# Patient Record
Sex: MALE | Race: WHITE | NOT HISPANIC OR LATINO | Employment: OTHER | URBAN - METROPOLITAN AREA
[De-identification: names, ages, dates, MRNs, and addresses within clinical notes are randomized per-mention and may not be internally consistent; named-entity substitution may affect disease eponyms.]

---

## 2018-10-05 ENCOUNTER — OFFICE VISIT (OUTPATIENT)
Dept: ORTHOPEDIC SURGERY | Facility: CLINIC | Age: 58
End: 2018-10-05

## 2018-10-05 VITALS — BODY MASS INDEX: 35.57 KG/M2 | HEIGHT: 70 IN | OXYGEN SATURATION: 98 % | HEART RATE: 77 BPM | WEIGHT: 248.46 LBS

## 2018-10-05 DIAGNOSIS — M76.62 TENDONITIS, ACHILLES, LEFT: Primary | ICD-10-CM

## 2018-10-05 PROCEDURE — 99203 OFFICE O/P NEW LOW 30 MIN: CPT | Performed by: PHYSICIAN ASSISTANT

## 2018-10-05 RX ORDER — METFORMIN HYDROCHLORIDE 500 MG/1
500 TABLET, EXTENDED RELEASE ORAL DAILY
Refills: 1 | COMMUNITY
Start: 2018-09-07

## 2018-10-05 RX ORDER — DICLOFENAC SODIUM 75 MG/1
75 TABLET, DELAYED RELEASE ORAL 2 TIMES DAILY
Refills: 5 | COMMUNITY
Start: 2018-09-07 | End: 2020-12-11

## 2018-10-05 RX ORDER — HYDROCHLOROTHIAZIDE 25 MG/1
TABLET ORAL
Refills: 1 | COMMUNITY
Start: 2018-09-10

## 2018-10-05 RX ORDER — FENOPROFEN CALCIUM 200 MG/1
CAPSULE ORAL
Refills: 4 | COMMUNITY
Start: 2018-09-19 | End: 2022-04-20

## 2018-10-05 RX ORDER — SALIVA SUBSTITUTE COMB NO.11 351 MG
POWDER IN PACKET (EA) MUCOUS MEMBRANE
Refills: 3 | COMMUNITY
Start: 2018-09-07 | End: 2022-04-20

## 2018-10-05 RX ORDER — BUPROPION HYDROCHLORIDE 150 MG/1
TABLET ORAL
Refills: 1 | COMMUNITY
Start: 2018-09-10

## 2018-10-05 RX ORDER — DUTASTERIDE 0.5 MG/1
0.5 CAPSULE, LIQUID FILLED ORAL DAILY
Refills: 11 | COMMUNITY
Start: 2018-09-07

## 2018-10-05 RX ORDER — ROSUVASTATIN CALCIUM 5 MG/1
5 TABLET, COATED ORAL DAILY
Refills: 1 | COMMUNITY
Start: 2018-09-10 | End: 2020-12-11

## 2018-10-05 RX ORDER — RAMIPRIL 10 MG/1
10 CAPSULE ORAL 2 TIMES DAILY
Refills: 1 | COMMUNITY
Start: 2018-09-07

## 2018-10-05 RX ORDER — NEBIVOLOL HYDROCHLORIDE 10 MG/1
20 TABLET ORAL DAILY
Refills: 1 | COMMUNITY
Start: 2018-09-07

## 2018-10-05 NOTE — PROGRESS NOTES
[facility] [place] [facility]    Subjective     Patient: Antony Williamson  : 1960    Primary Care Provider: Toby Williamson MD    Requesting Provider: As above    Pain of the Left Ankle      History    Chief Complaint: Left Achilles pain    History of Present Illness: This is a very pleasant 57-year-old male presenting today to discuss his intermittent 5 year history of left Achilles tendon pain.  He denies any trauma or injury.  He complains of a burning pain that can sometimes bother him both at rest and with activity.  He reports the intensity varies depending on his activity level.  He used to be very active and a runner and has had to discontinue and decreased activity secondary to the Achilles pain.  He finds that he decrease his activity his pain goes away within 2 weeks of returning back to increased activity the pain returns.  He is treated in the past with a variety of conservative treatment including rest, boot for several weeks, physical therapy for just 3 weeks at a time, several cortisone injections with returning pain.  He would like to find a treatment that would resolve his problem so he can return to his activity.    No current outpatient prescriptions on file prior to visit.     No current facility-administered medications on file prior to visit.       No Known Allergies   Past Medical History:   Diagnosis Date   • Diabetes (CMS/HCC)    • High cholesterol    • Hypertension      Past Surgical History:   Procedure Laterality Date   • HERNIA REPAIR     • SHOULDER SURGERY Left     arthroscopy     Family History   Problem Relation Age of Onset   • Cancer Mother    • Heart disease Father       Social History     Social History   • Marital status:      Spouse name: N/A   • Number of children: N/A   • Years of education: N/A     Occupational History   • Not on file.     Social History Main Topics   • Smoking status: Never Smoker   • Smokeless tobacco: Never Used   •  "Alcohol use No   • Drug use: No   • Sexual activity: Defer     Other Topics Concern   • Not on file     Social History Narrative   • No narrative on file        Review of Systems   Constitutional: Negative.    HENT: Negative.    Eyes: Negative.    Respiratory: Negative.    Cardiovascular: Negative.    Gastrointestinal: Negative.    Endocrine: Negative.    Genitourinary: Negative.    Musculoskeletal: Positive for arthralgias (ankle pain) and back pain.   Skin: Negative.    Allergic/Immunologic: Negative.    Neurological: Negative.    Hematological: Negative.    Psychiatric/Behavioral: Negative.        The following portions of the patient's history were reviewed and updated as appropriate: allergies, current medications, past family history, past medical history, past social history, past surgical history and problem list.      Objective      Physical Exam  Pulse 77   Ht 177.8 cm (70\")   Wt 113 kg (248 lb 7.3 oz)   SpO2 98%   BMI 35.65 kg/m²     Body mass index is 35.65 kg/m².    GENERAL: Body habitus: obese    Lower extremity edema: Left: trace; Right: trace    Varicose veins:  Left: mild; Right: mild    Gait: normal     Mental Status:  awake and alert; oriented to person, place, and time    Voice:  clear  SKIN:  Normal    Hair Growth:  Right:normal; Left:  normal  HEENT: Head: Normocephalic, atraumatic,  without obvious abnormality.  eye: normal external eye, no icterus   PULM:  Repiratory effort normal    Ortho Exam  V:  Dorsalis Pedis:  Right: 2+; Left:2+    Posterior Tibial: Right:2+; Left:2+    Capillary Refill:  Brisk  MSK:  Hand:right handed      Tibia:  Right:  non tender; Left:  non tender      Ankle:  Right: non tender, ROM  normal and motor function  normal; Left:  non tender, ROM  normal and motor function  normal      Foot:  Right:  non tender; Left:  non tender      NEURO: Heel Walking:  Right:  normal; Left:  normal    Toe Walking:  Right:  normal; Left:  normal     [place]-Gurmeet 5.07 " "monofilament test: normal    Lower extremity sensation: intact     Calf Atrophy:none    Motor Function: all 5/5          Medical Decision Making    Data Review:   ordered and reviewed x-rays today    Assessment:  1. Tendonitis, Achilles, left        Plan:  Left classic Achilles tendonitis.  His pain is intermittent and he is not having much pain at this time.  He has been treated with a multitude of conservative treatment, however, it is just not been long enough.  I explained to the overuse/aging nature of the problem to the patient.  I explained the \"bump\" in the tendon will be there permanently, the goal of treatment is to have the pain resolve.    I explained that the Sports Medicine research literature shows that a 12 week course of physical therapy, stretching, and night splinting will allow 85% of patients to get back to their prior level of activity.    I would also recommend a trial of Voltaren gel.    I explained that surgery does not work for this problem.    If initial treatment does not improve pain and enough, the second stage treatment is a short leg walking cast for 6 weeks.    I showed the patient the stretches that we recommended doing in addition to physical therapy, may need to do them 5 reps, 6-8 times per day.  The information sheet was given.    We discussed where to obtain a night splint and gave them a  prescription for physical therapy.    I would recommend he try a full 12 week course of physical therapy as well as night splinting.  I reassured him that his x-rays look fine.  The calcification in the tendon is nothing to be concerned with it is not the source of his pain.  I discussed with him that his diabetes though well-controlled is likely contributing to his persisting intermittent tendinitis as well.  I explained the that the glucose crosslinks the tendon making it stiff.  I've given him a detailed prescription for physical therapy and he'll return for casting if " needed.                Avelina Rice PA-C  10/09/18  8:40 AM

## 2018-10-09 ENCOUNTER — TELEPHONE (OUTPATIENT)
Dept: ORTHOPEDIC SURGERY | Facility: CLINIC | Age: 58
End: 2018-10-09

## 2018-10-09 NOTE — TELEPHONE ENCOUNTER
I can offer the patient topical anti-inflammatory.  Given he has private insurance, I can send a pennsaid prescription.  Otherwise, I would recommend he continue stretching and go to PT.  In the long run the strethching will improve his symptoms.

## 2018-10-09 NOTE — TELEPHONE ENCOUNTER
PATIENT CALLED AND SAID ON HIS LAST VISIT HE WAS TOLD TO DO SOME STRETCHES TO HELP WITH HIS ACHILLES TENDON BUT HE THINKS THE STRETCHES ARE MAKING IT WORSE. HE SAID EXERCISING/STRETCHING MAKES IT FLARE UP MORE THAN NORMAL. PLEASE CALL BACK [phone].

## 2018-10-09 NOTE — TELEPHONE ENCOUNTER
He already has voltaren gel script so I recommended he try that and do his stretches. He is going to do this for a couple weeks and call us back if there is no improvement.  Sherri

## 2020-12-11 ENCOUNTER — OFFICE VISIT (OUTPATIENT)
Dept: ORTHOPEDIC SURGERY | Facility: CLINIC | Age: 60
End: 2020-12-11

## 2020-12-11 VITALS — BODY MASS INDEX: 37.08 KG/M2 | HEART RATE: 74 BPM | OXYGEN SATURATION: 98 % | HEIGHT: 70 IN | WEIGHT: 259 LBS

## 2020-12-11 DIAGNOSIS — M25.572 LEFT ANKLE PAIN, UNSPECIFIED CHRONICITY: Primary | ICD-10-CM

## 2020-12-11 DIAGNOSIS — M76.62 TENDONITIS, ACHILLES, LEFT: ICD-10-CM

## 2020-12-11 PROCEDURE — 99213 OFFICE O/P EST LOW 20 MIN: CPT | Performed by: PHYSICIAN ASSISTANT

## 2020-12-11 RX ORDER — SEMAGLUTIDE 1.34 MG/ML
INJECTION, SOLUTION SUBCUTANEOUS
COMMUNITY
Start: 2020-11-12 | End: 2022-04-20

## 2020-12-11 RX ORDER — ROSUVASTATIN CALCIUM 10 MG/1
TABLET, COATED ORAL
COMMUNITY
Start: 2020-11-19

## 2020-12-11 RX ORDER — ASPIRIN 81 MG/1
TABLET ORAL
COMMUNITY
Start: 2020-09-21

## 2020-12-11 RX ORDER — VITAMIN B COMPLEX
TABLET ORAL
COMMUNITY
Start: 2020-09-21

## 2020-12-11 RX ORDER — NIFEDIPINE 30 MG/1
TABLET, EXTENDED RELEASE ORAL
COMMUNITY
Start: 2020-11-30

## 2020-12-11 NOTE — PROGRESS NOTES
[facility] [place] [facility]    Subjective     Patient: Antony Williamson  : 1960    Primary Care Provider: Toby Williamson MD    Requesting Provider: As above    Follow-up (Left Ankle )      History    Chief Complaint: Left Achilles pain    History of Present Illness: Patient returns today with left Achilles pain.  He was last seen in 2018 and recommended to do physical therapy and night splinting.  He reports symptoms are the same.  He has pain in the Achilles with weightbearing and walking he has difficulty after walking about half a mile.  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  He did 3 visits of physical therapy and a boot but reports that he was having pain so he discontinued with concern for hurting it.  It has just gotten progressively worse.  He subsequently has had MRI which he brings with him for review.    Current Outpatient Medications on File Prior to Visit   Medication Sig Dispense Refill   • Artificial Saliva (SALIVAMAX) pack MIX EACH PACKET WITH 30MLS WATER AND RINSE MOUTH UP TO 10 TIMES PER DAY  3   • Aspirin Adult Low Strength 81 MG EC tablet      • buPROPion XL (WELLBUTRIN XL) 150 MG 24 hr tablet TAKE 1 TABLET EACH MORNING  1   • BYSTOLIC 10 MG tablet Take 20 mg by mouth Daily.  1   • Cholecalciferol (Vitamin D) 25 MCG (1000 UT) tablet      • dutasteride (AVODART) 0.5 MG capsule Take 0.5 mg by mouth Daily.  11   • Fenoprofen Calcium 200 MG capsule TAKE 2 CAPSULE S  3 TIMES DAILY FOR OSTEOARTHRITIS  4   • hydrochlorothiazide (HYDRODIURIL) 25 MG tablet TAKE 1 2  1 EACH MORNING AS NEEDED FOR EDEMA  1   • metFORMIN ER (GLUCOPHAGE-XR) 500 MG 24 hr tablet Take 500 mg by mouth Daily.  1   • NIFEdipine XL (PROCARDIA XL) 30 MG 24 hr tablet      • Ozempic, 0.25 or 0.5 MG/DOSE, 2 MG/1.5ML solution pen-injector      • ramipril (ALTACE) 10 MG capsule Take 10 mg by mouth 2 (Two) Times a Day.  1   • rosuvastatin (CRESTOR) 10 MG tablet        No current  "facility-administered medications on file prior to visit.       No Known Allergies   Past Medical History:   Diagnosis Date   • Diabetes (CMS/HCC)    • High cholesterol    • Hypertension      Past Surgical History:   Procedure Laterality Date   • HERNIA REPAIR     • SHOULDER SURGERY Left 2008    arthroscopy     Family History   Problem Relation Age of Onset   • Cancer Mother    • Heart disease Father       Social History     Socioeconomic History   • Marital status:      Spouse name: Not on file   • Number of children: Not on file   • Years of education: Not on file   • Highest education level: Not on file   Tobacco Use   • Smoking status: Never Smoker   • Smokeless tobacco: Never Used   Substance and Sexual Activity   • Alcohol use: No   • Drug use: No   • Sexual activity: Defer        Review of Systems   Constitutional: Negative.    HENT: Negative.    Eyes: Negative.    Respiratory: Negative.    Cardiovascular: Negative.    Gastrointestinal: Negative.    Endocrine: Negative.    Genitourinary: Negative.    Musculoskeletal: Positive for arthralgias.   Skin: Negative.    Allergic/Immunologic: Negative.    Neurological: Negative.    Hematological: Negative.    Psychiatric/Behavioral: Negative.        The following portions of the patient's history were reviewed and updated as appropriate: allergies, current medications, past family history, past medical history, past social history, past surgical history and problem list.      Objective      Physical Exam  Pulse 74   Ht 177.8 cm (70\")   Wt 117 kg (259 lb)   SpO2 98%   BMI 37.16 kg/m²     Body mass index is 37.16 kg/m².    Patient is well developed, well nourished and in no acute distress.  Alert and oriented x 3.    Ortho Exam  V:  Dorsalis Pedis:   Left:2+    Posterior Tibial:Left:2+    Capillary Refill:  Brisk  MSK:  Tibia:   Left:  non tender      Ankle:   Left:  tender Over the insertion of the Achilles tendon      Foot:   Left:  non " tender      NEURO: [place]-Gurmeet 5.07 monofilament test: not evaluated    Lower extremity sensation: intact     Calf Atrophy:none    Motor Function: all 5/5            Medical Decision Making    Data Review:   reviewed radiology images and reviewed outside records    Assessment:  1. Left ankle pain, unspecified chronicity    2. Tendonitis, Achilles, left        Plan:  Left insertional Achilles tendinitis.  I reviewed MRI findings from 11/19/2020 and clinical findings past and current treatment the patient.  MRI shows calcific tendinitis with edema in the bursa and no acute tear.  We discussed treatment options with the patient including returning to physical therapy and night splinting versus casting.  Given that this is been going on for so long, recommendation today is that he go into a short leg weightbearing fiberglass cast.  He understands he will not be 100% when he comes out of the cast will have to return to night splinting and physical therapy at that time.  I will see him back in 6 weeks or sooner if needed.    Patient history, diagnosis and treatment plan discussed with Dr. Coello.        Avelina Rice PA-C  12/15/20  08:09 EST

## 2020-12-16 ENCOUNTER — TELEPHONE (OUTPATIENT)
Dept: ORTHOPEDIC SURGERY | Facility: CLINIC | Age: 60
End: 2020-12-16

## 2020-12-16 NOTE — TELEPHONE ENCOUNTER
I called and spoke with the patient.  He was not sure how the cast should feel or how lose it should be.  I explained that if he can set his foot down and hold the cast if he can lift his leg up more that an inch then he needs to come in and get it changed.  He said that it was not moving that much.  His cast is not bothering him.  Ivania

## 2020-12-16 NOTE — TELEPHONE ENCOUNTER
PATIENT CALLED STATED SHE HAD A CAST PLACED ON 12/11/20. PATIENT CAN MOVE LEFT FOOT UP AND DOWN  AND PATIENT FEELS CAST MOVING AS WELL. PATIENT WOULD LIKE A CALL BACK [phone]

## 2021-01-22 ENCOUNTER — OFFICE VISIT (OUTPATIENT)
Dept: ORTHOPEDIC SURGERY | Facility: CLINIC | Age: 61
End: 2021-01-22

## 2021-01-22 VITALS — HEART RATE: 85 BPM | HEIGHT: 70 IN | WEIGHT: 260.8 LBS | OXYGEN SATURATION: 98 % | BODY MASS INDEX: 37.34 KG/M2

## 2021-01-22 DIAGNOSIS — M76.62 TENDONITIS, ACHILLES, LEFT: Primary | ICD-10-CM

## 2021-01-22 PROCEDURE — 99212 OFFICE O/P EST SF 10 MIN: CPT | Performed by: PHYSICIAN ASSISTANT

## 2021-01-22 NOTE — PROGRESS NOTES
[facility] [place] [facility] [place]    Subjective     Patient: Antony Williamson  : 1960    Primary Care Provider: Toby Williamson MD    Requesting Provider: As above    Follow-up (6 weeks follow up for Tendonitis, Achilles, left )      History    Chief Complaint: Follow-up left Achilles    History of Present Illness: Patient returns today for follow-up of his left Achilles tendinitis after 6 weeks of casting.  He reports improved pain in the cast.  No new symptoms.    Current Outpatient Medications on File Prior to Visit   Medication Sig Dispense Refill   • Artificial Saliva (SALIVAMAX) pack MIX EACH PACKET WITH 30MLS WATER AND RINSE MOUTH UP TO 10 TIMES PER DAY  3   • Aspirin Adult Low Strength 81 MG EC tablet      • buPROPion XL (WELLBUTRIN XL) 150 MG 24 hr tablet TAKE 1 TABLET EACH MORNING  1   • BYSTOLIC 10 MG tablet Take 20 mg by mouth Daily.  1   • Cholecalciferol (Vitamin D) 25 MCG (1000 UT) tablet      • dutasteride (AVODART) 0.5 MG capsule Take 0.5 mg by mouth Daily.  11   • Fenoprofen Calcium 200 MG capsule TAKE 2 CAPSULE S  3 TIMES DAILY FOR OSTEOARTHRITIS  4   • hydrochlorothiazide (HYDRODIURIL) 25 MG tablet TAKE 1 2  1 EACH MORNING AS NEEDED FOR EDEMA  1   • metFORMIN ER (GLUCOPHAGE-XR) 500 MG 24 hr tablet Take 500 mg by mouth Daily.  1   • NIFEdipine XL (PROCARDIA XL) 30 MG 24 hr tablet      • Ozempic, 0.25 or 0.5 MG/DOSE, 2 MG/1.5ML solution pen-injector      • ramipril (ALTACE) 10 MG capsule Take 10 mg by mouth 2 (Two) Times a Day.  1   • rosuvastatin (CRESTOR) 10 MG tablet        No current facility-administered medications on file prior to visit.       No Known Allergies   Past Medical History:   Diagnosis Date   • Diabetes (CMS/HCC)    • High cholesterol    • Hypertension      Past Surgical History:   Procedure Laterality Date   • HERNIA REPAIR     • SHOULDER SURGERY Left     arthroscopy     Family History   Problem Relation Age of Onset   • Cancer Mother    • Heart disease  "Father       Social History     Socioeconomic History   • Marital status:      Spouse name: Not on file   • Number of children: Not on file   • Years of education: Not on file   • Highest education level: Not on file   Tobacco Use   • Smoking status: Never Smoker   • Smokeless tobacco: Never Used   Substance and Sexual Activity   • Alcohol use: No   • Drug use: No   • Sexual activity: Defer        Review of Systems   Constitutional: Negative.    HENT: Negative.    Eyes: Negative.    Respiratory: Negative.    Cardiovascular: Negative.    Gastrointestinal: Negative.    Endocrine: Negative.    Genitourinary: Negative.    Musculoskeletal: Positive for arthralgias.   Skin: Negative.    Allergic/Immunologic: Negative.    Neurological: Negative.    Hematological: Negative.    Psychiatric/Behavioral: Negative.        The following portions of the patient's history were reviewed and updated as appropriate: allergies, current medications, past family history, past medical history, past social history, past surgical history and problem list.      Objective      Physical Exam  Pulse 85   Ht 177.8 cm (70\")   Wt 118 kg (260 lb 12.8 oz)   SpO2 98%   BMI 37.42 kg/m²     Body mass index is 37.42 kg/m².    Patient is well developed, well nourished and in no acute distress.  Alert and oriented x 3.    Ortho Exam  Left ankle exam: Nontender to palpation over the insertion of the Achilles tendon mild stiffness as expected.  Neurovascular intact with pulses 2+    Medical Decision Making    Data Review:   none    Assessment:  1. Tendonitis, Achilles, left        Plan:  Left insertional Achilles tendinitis improved with 6 weeks casting.  Have given the patient prescription for physical therapy to return as well as show him what kind a night splint he needs to get.  I encouraged him to stretch before and after activity and we discussed slowly increasing activity.  He will return to see us as needed.      Avelina Rice, " ALTON  01/22/21  08:45 EST     Statement Selected

## 2022-04-20 ENCOUNTER — OFFICE VISIT (OUTPATIENT)
Dept: ORTHOPEDIC SURGERY | Facility: CLINIC | Age: 62
End: 2022-04-20

## 2022-04-20 VITALS
HEIGHT: 70 IN | BODY MASS INDEX: 36.36 KG/M2 | DIASTOLIC BLOOD PRESSURE: 78 MMHG | WEIGHT: 254 LBS | SYSTOLIC BLOOD PRESSURE: 130 MMHG

## 2022-04-20 DIAGNOSIS — M25.571 ACUTE RIGHT ANKLE PAIN: Primary | ICD-10-CM

## 2022-04-20 DIAGNOSIS — M76.61 RIGHT ACHILLES TENDINITIS: ICD-10-CM

## 2022-04-20 PROCEDURE — 99213 OFFICE O/P EST LOW 20 MIN: CPT | Performed by: PHYSICIAN ASSISTANT

## 2022-04-20 RX ORDER — ZOLPIDEM TARTRATE 5 MG/1
5 TABLET ORAL NIGHTLY PRN
COMMUNITY
Start: 2022-03-22

## 2022-04-20 RX ORDER — TELMISARTAN 80 MG/1
80 TABLET ORAL DAILY
COMMUNITY
Start: 2022-04-13

## 2022-04-20 RX ORDER — SEMAGLUTIDE 1.34 MG/ML
INJECTION, SOLUTION SUBCUTANEOUS
COMMUNITY
Start: 2022-04-15

## 2022-04-20 NOTE — PROGRESS NOTES
"        [facility] Note        Subjective     CC: Pain of the Right Foot      HPI    Antony Williamson is a 61 y.o. male.  Patient comes in today with a new problem.  He is here with complaints of right Achilles pain for 6 months.  No trauma no injury.  Pain rates 3-8/10.  He has tried to treat by on his own with physical therapy and a boot.  The physical therapy made it worse.  He has been treated in the past for left Achilles tendinitis and eventually needed casting.  He would like to just jump to a cast at this point.    Overall, patient's symptoms are worsening    ROS:    Constiutional:Pt denies fever, chills, nausea, or vomiting.  MSK:as above        Objective      Past Medical History  Past Medical History:   Diagnosis Date   • Diabetes (HCC)    • High cholesterol    • Hypertension          Physical Exam  /78   Ht 177.8 cm (70\")   Wt 115 kg (254 lb)   BMI 36.45 kg/m²     Body mass index is 36.45 kg/m².    Patient is well nourished and well developed.        Ortho Exam  Right foot and ankle exam: Tender palpation over the insertion of the Achilles tendon.  5/5 motor strength.  No swelling warmth or erythema.  Neurovascular intact distally.  Pulses 2+.    Imaging/Labs/EMG Reviewed:  Imaging Results (Last 24 Hours)     Procedure Component Value Units Date/Time    XR Ankle 2 View Right [[id number]] Resulted: 04/20/22 1004     Updated: 04/20/22 1014            Assessment    Assessment:  1. Acute right ankle pain    2. Right Achilles tendinitis        Plan:  1. Recommend over the counter anti-inflammatories for pain and/or swelling  2. Right Achilles tendinitis.  Patient has been treated for left Achilles tendinitis in the past with PT, night splinting and eventually casting.  The casting was the only thing that helped him.  Patient's daughter is getting  on destination wedding in a little over 4 weeks.  He would like to just jump to casting at this point.  He understands it will not " be the full 6 weeks.  Hopefully, he will have improved symptoms and then can stretch and night splint on his own.  Plan today patient going to a weightbearing fiberglass short leg cast.  I will see him back in 4 weeks or sooner if needed.    Patient history, diagnosis and treatment plan discussed with Dr. Coello.        Avelina Rice PA-C  04/20/22  13:56 EDT

## 2022-05-20 ENCOUNTER — OFFICE VISIT (OUTPATIENT)
Dept: ORTHOPEDIC SURGERY | Facility: CLINIC | Age: 62
End: 2022-05-20

## 2022-05-20 VITALS
HEIGHT: 70 IN | BODY MASS INDEX: 36.3 KG/M2 | WEIGHT: 253.53 LBS | SYSTOLIC BLOOD PRESSURE: 132 MMHG | DIASTOLIC BLOOD PRESSURE: 82 MMHG

## 2022-05-20 DIAGNOSIS — M76.61 RIGHT ACHILLES TENDINITIS: Primary | ICD-10-CM

## 2022-05-20 PROCEDURE — 99212 OFFICE O/P EST SF 10 MIN: CPT | Performed by: PHYSICIAN ASSISTANT

## 2022-05-20 NOTE — PROGRESS NOTES
"        [facility] [place] [facility] [place]        Subjective     CC: Follow-up (4 weeks- Right Achilles tendinitis )      HPI    Antony Williamson is a 61 y.o. male.  Patient returns for his right Achilles tendinitis.  He has been casted for 4 weeks.  He is walking his daughter down the aisle next weekend and would like to come out of the cast.  Some improvement.    Overall, patient's symptoms are improved    ROS:    Constiutional:Pt denies fever, chills, nausea, or vomiting.  MSK:as above        Objective      Past Medical History  Past Medical History:   Diagnosis Date   • Diabetes (HCC)    • High cholesterol    • Hypertension          Physical Exam  /82   Ht 177.8 cm (70\")   Wt 115 kg (253 lb 8.5 oz)   BMI 36.38 kg/m²     Body mass index is 36.38 kg/m².    Patient is well nourished and well developed.        Ortho Exam  Right ankle exam: Mildly tender palpation over the Achilles tendon.  5/5 motor strength.  Neurovascular intact distally.    Imaging/Labs/EMG Reviewed:  Imaging Results (Last 24 Hours)     ** No results found for the last 24 hours. **            Assessment    Assessment:  1. Right Achilles tendinitis        Plan:  1. Recommend over the counter anti-inflammatories for pain and/or swelling  2. Right Achilles tendinitis.  Patient has been casted for 1 month.  I encouraged him to return to aggressive stretching and night splinting.  He will return to see me as needed.      Avelina Rice PA-C  05/24/22  11:04 EDT      "

## 2022-05-23 ENCOUNTER — TELEPHONE (OUTPATIENT)
Dept: ORTHOPEDIC SURGERY | Facility: CLINIC | Age: 62
End: 2022-05-23

## 2022-05-23 NOTE — TELEPHONE ENCOUNTER
Caller: LORENA APARICIO    Relationship to patient: SELF    Best call back number:  [phone]    Chief complaint:  PATIENT WAS SEEN Friday, 5/20/22 AND HAD RIGHT ANKLE CAST REMOVED. HE IS WALKING HIS DAUGHTER DOWN THE ISLE THIS WEEKEND. PATIENT SAYS ANKLE STILL NOT RIGHT AND WANTS TO SEE RK RIGHT AFTER MEMORIAL DAY TO HAVE ANOTHER CAST PUT ON? PLEASE ADVISE.    Type of visit:  F/U    Requested date:  AFTER 5/30/22

## 2022-05-24 NOTE — TELEPHONE ENCOUNTER
I know he was only casted for one month.  We can get him back in a cast for 3 weeks or he can go to PT and night splint.  I would not expect him to be 100% even if he was casted for 6 weeks.  Please make sure he continues to stretch and night splint while he is out of the cast.

## 2022-05-24 NOTE — TELEPHONE ENCOUNTER
I called patient and explained what Avelina said to him and he understood. He is going to keep trying the stretching and night splinting and call us back if he wants to go back into a cast.  Sherri Carias RT (R), ROT

## 2022-06-06 ENCOUNTER — OFFICE VISIT (OUTPATIENT)
Dept: ORTHOPEDIC SURGERY | Facility: CLINIC | Age: 62
End: 2022-06-06

## 2022-06-06 VITALS
SYSTOLIC BLOOD PRESSURE: 124 MMHG | WEIGHT: 253.53 LBS | BODY MASS INDEX: 36.3 KG/M2 | DIASTOLIC BLOOD PRESSURE: 84 MMHG | HEIGHT: 70 IN

## 2022-06-06 DIAGNOSIS — M76.61 RIGHT ACHILLES TENDINITIS: Primary | ICD-10-CM

## 2022-06-06 PROCEDURE — 99212 OFFICE O/P EST SF 10 MIN: CPT | Performed by: PHYSICIAN ASSISTANT

## 2022-06-06 NOTE — PROGRESS NOTES
"        [facility] [place] [place] [facility] Note        Subjective     CC: Follow-up (2 weeks- Right Achilles tendinitis )      HPI    Antony Williamson is a 61 y.o. male. Patient returns for his right AT.  He was recently casted for 4 weeks because of his son's wedding.  He went back to work and began having increased pain.  He has failed PT in the past on the left and did great with 6 weeks casting.  He would like to cast for a full 6 weeks this time     Overall, patient's symptoms are worsening    ROS:    Constiutional:Pt denies fever, chills, nausea, or vomiting.  MSK:as above        Objective      Past Medical History  Past Medical History:   Diagnosis Date   • Diabetes (HCC)    • High cholesterol    • Hypertension          Physical Exam  /84   Ht 177.8 cm (70\")   Wt 115 kg (253 lb 8.5 oz)   BMI 36.38 kg/m²     Body mass index is 36.38 kg/m².    Patient is well nourished and well developed.        Ortho Exam  Right ankle: Patient tender over the achilles tendon and insertion.  5/5 motor strength    Imaging/Labs/EMG Reviewed:  Imaging Results (Last 24 Hours)     ** No results found for the last 24 hours. **            Assessment    Assessment:  1. Right Achilles tendinitis        Plan:  1. Recommend over the counter anti-inflammatories for pain and/or swelling  2. Right achilles tendonitis.  WE discussed PT and night splinting vs. Casting.  I explained that he was preet on the left that the pain was resolved when he came out of a cast.  Plan is he go into SL fiberglass WB cast.  I will see him back in 6 weeks or sooner if needed.        Avelina Rice PA-C  06/07/22  15:43 EDT      "

## 2024-08-09 RX ORDER — DOCUSATE SODIUM 100 MG/1
100 CAPSULE, LIQUID FILLED ORAL 2 TIMES DAILY
COMMUNITY

## 2024-08-09 RX ORDER — ACETAMINOPHEN 500 MG
1000 TABLET ORAL 3 TIMES DAILY
COMMUNITY

## 2024-08-09 RX ORDER — DIPHENHYDRAMINE HCL 25 MG
25 CAPSULE ORAL EVERY 6 HOURS PRN
COMMUNITY

## 2024-08-09 RX ORDER — TRAMADOL HYDROCHLORIDE 50 MG/1
50 TABLET ORAL EVERY 12 HOURS PRN
COMMUNITY
End: 2024-08-16 | Stop reason: HOSPADM

## 2024-08-15 ENCOUNTER — ANESTHESIA EVENT (OUTPATIENT)
Dept: PERIOP | Facility: HOSPITAL | Age: 64
End: 2024-08-15
Payer: COMMERCIAL

## 2024-08-15 RX ORDER — SODIUM CHLORIDE 9 MG/ML
40 INJECTION, SOLUTION INTRAVENOUS AS NEEDED
Status: CANCELLED | OUTPATIENT
Start: 2024-08-15

## 2024-08-15 RX ORDER — SODIUM CHLORIDE 0.9 % (FLUSH) 0.9 %
10 SYRINGE (ML) INJECTION EVERY 12 HOURS SCHEDULED
Status: CANCELLED | OUTPATIENT
Start: 2024-08-15

## 2024-08-15 RX ORDER — SODIUM CHLORIDE 0.9 % (FLUSH) 0.9 %
10 SYRINGE (ML) INJECTION AS NEEDED
Status: CANCELLED | OUTPATIENT
Start: 2024-08-15

## 2024-08-15 RX ORDER — FAMOTIDINE 10 MG/ML
20 INJECTION, SOLUTION INTRAVENOUS ONCE
Status: CANCELLED | OUTPATIENT
Start: 2024-08-15 | End: 2024-08-15

## 2024-08-16 ENCOUNTER — APPOINTMENT (OUTPATIENT)
Dept: GENERAL RADIOLOGY | Facility: HOSPITAL | Age: 64
End: 2024-08-16
Payer: COMMERCIAL

## 2024-08-16 ENCOUNTER — HOSPITAL ENCOUNTER (OUTPATIENT)
Facility: HOSPITAL | Age: 64
Discharge: HOME | End: 2024-08-16
Attending: ORTHOPAEDIC SURGERY | Admitting: ORTHOPAEDIC SURGERY
Payer: COMMERCIAL

## 2024-08-16 ENCOUNTER — ANESTHESIA (OUTPATIENT)
Dept: PERIOP | Facility: HOSPITAL | Age: 64
End: 2024-08-16
Payer: COMMERCIAL

## 2024-08-16 ENCOUNTER — ANESTHESIA EVENT CONVERTED (OUTPATIENT)
Dept: ANESTHESIOLOGY | Facility: HOSPITAL | Age: 64
End: 2024-08-16
Payer: COMMERCIAL

## 2024-08-16 VITALS
RESPIRATION RATE: 14 BRPM | SYSTOLIC BLOOD PRESSURE: 145 MMHG | TEMPERATURE: 97.4 F | DIASTOLIC BLOOD PRESSURE: 94 MMHG | BODY MASS INDEX: 33.64 KG/M2 | WEIGHT: 235 LBS | HEIGHT: 70 IN | HEART RATE: 64 BPM | OXYGEN SATURATION: 94 %

## 2024-08-16 DIAGNOSIS — M19.012 GLENOHUMERAL ARTHRITIS, LEFT: Primary | ICD-10-CM

## 2024-08-16 PROBLEM — N13.8 BENIGN PROSTATIC HYPERPLASIA WITH URINARY OBSTRUCTION: Status: ACTIVE | Noted: 2024-08-16

## 2024-08-16 PROBLEM — G47.00 INSOMNIA: Status: ACTIVE | Noted: 2023-03-19

## 2024-08-16 PROBLEM — N40.1 BENIGN PROSTATIC HYPERPLASIA WITH URINARY OBSTRUCTION: Status: ACTIVE | Noted: 2024-08-16

## 2024-08-16 PROBLEM — E78.2 MIXED HYPERLIPIDEMIA: Status: ACTIVE | Noted: 2023-03-19

## 2024-08-16 PROBLEM — E11.65 TYPE 2 DIABETES MELLITUS WITH HYPERGLYCEMIA: Status: ACTIVE | Noted: 2023-03-19

## 2024-08-16 PROBLEM — I10 ESSENTIAL HYPERTENSION: Status: ACTIVE | Noted: 2023-03-19

## 2024-08-16 LAB
GLUCOSE BLDC GLUCOMTR-MCNC: 161 MG/DL (ref 70–130)
POTASSIUM SERPL-SCNC: 4.3 MMOL/L (ref 3.5–5.2)

## 2024-08-16 PROCEDURE — 25010000002 ROPIVACAINE HCL-NACL 0.2-0.9 % SOLUTION: Performed by: NURSE ANESTHETIST, CERTIFIED REGISTERED

## 2024-08-16 PROCEDURE — 25010000002 SUGAMMADEX 500 MG/5ML SOLUTION: Performed by: NURSE ANESTHETIST, CERTIFIED REGISTERED

## 2024-08-16 PROCEDURE — 97550 CAREGIVER TRAING 1ST 30 MIN: CPT

## 2024-08-16 PROCEDURE — C1713 ANCHOR/SCREW BN/BN,TIS/BN: HCPCS | Performed by: ORTHOPAEDIC SURGERY

## 2024-08-16 PROCEDURE — C1776 JOINT DEVICE (IMPLANTABLE): HCPCS | Performed by: ORTHOPAEDIC SURGERY

## 2024-08-16 PROCEDURE — 25010000002 CEFAZOLIN PER 500 MG: Performed by: ORTHOPAEDIC SURGERY

## 2024-08-16 PROCEDURE — 25010000002 PHENYLEPHRINE 10 MG/ML SOLUTION 1 ML VIAL: Performed by: NURSE ANESTHETIST, CERTIFIED REGISTERED

## 2024-08-16 PROCEDURE — 25010000002 DEXAMETHASONE PER 1 MG: Performed by: NURSE ANESTHETIST, CERTIFIED REGISTERED

## 2024-08-16 PROCEDURE — G0378 HOSPITAL OBSERVATION PER HR: HCPCS

## 2024-08-16 PROCEDURE — 25010000002 FENTANYL CITRATE (PF) 50 MCG/ML SOLUTION

## 2024-08-16 PROCEDURE — 25810000003 LACTATED RINGERS PER 1000 ML: Performed by: STUDENT IN AN ORGANIZED HEALTH CARE EDUCATION/TRAINING PROGRAM

## 2024-08-16 PROCEDURE — 73020 X-RAY EXAM OF SHOULDER: CPT

## 2024-08-16 PROCEDURE — 82948 REAGENT STRIP/BLOOD GLUCOSE: CPT

## 2024-08-16 PROCEDURE — 97165 OT EVAL LOW COMPLEX 30 MIN: CPT

## 2024-08-16 PROCEDURE — 25010000002 PROPOFOL 10 MG/ML EMULSION: Performed by: NURSE ANESTHETIST, CERTIFIED REGISTERED

## 2024-08-16 PROCEDURE — 25010000002 ONDANSETRON PER 1 MG: Performed by: NURSE ANESTHETIST, CERTIFIED REGISTERED

## 2024-08-16 PROCEDURE — 25010000002 VANCOMYCIN 1 G RECONSTITUTED SOLUTION: Performed by: ORTHOPAEDIC SURGERY

## 2024-08-16 PROCEDURE — L3670 SO ACRO/CLAV CAN WEB PRE OTS: HCPCS | Performed by: ORTHOPAEDIC SURGERY

## 2024-08-16 PROCEDURE — 97535 SELF CARE MNGMENT TRAINING: CPT

## 2024-08-16 PROCEDURE — 97110 THERAPEUTIC EXERCISES: CPT

## 2024-08-16 PROCEDURE — 84132 ASSAY OF SERUM POTASSIUM: CPT | Performed by: ORTHOPAEDIC SURGERY

## 2024-08-16 DEVICE — LINER HUM UNIVERS REVERS MD 39  PLS6MM: Type: IMPLANTABLE DEVICE | Site: SHOULDER | Status: FUNCTIONAL

## 2024-08-16 DEVICE — CUP SUT UNIVERS REVERS 39 PLS2 LT: Type: IMPLANTABLE DEVICE | Site: SHOULDER | Status: FUNCTIONAL

## 2024-08-16 DEVICE — SUT TP 1.7MM 38IN W/CUT NDL 1/2 CIR 76MM WHT/BLU: Type: IMPLANTABLE DEVICE | Site: SHOULDER | Status: FUNCTIONAL

## 2024-08-16 DEVICE — SCRW LK GLEN UNIVERS REVERS PERIPH 5.5X36MM: Type: IMPLANTABLE DEVICE | Site: SHOULDER | Status: FUNCTIONAL

## 2024-08-16 DEVICE — SCRW LK GLEN UNIVERS REVERS PERIPH 5.5X32MM: Type: IMPLANTABLE DEVICE | Site: SHOULDER | Status: FUNCTIONAL

## 2024-08-16 DEVICE — STEM HUM/SHLDR UNIVERS REVERS APEX SZ9: Type: IMPLANTABLE DEVICE | Site: SHOULDER | Status: FUNCTIONAL

## 2024-08-16 DEVICE — SCRW NL GLEN UNIVERS REVERS PERIPH 4.5X28MM: Type: IMPLANTABLE DEVICE | Site: SHOULDER | Status: FUNCTIONAL

## 2024-08-16 DEVICE — GLENOSPHERE LAT UNIVERS REVERS MODULAR 28MM 39PLS4: Type: IMPLANTABLE DEVICE | Site: SHOULDER | Status: FUNCTIONAL

## 2024-08-16 DEVICE — SCRW NL GLEN UNIVERS REVERS PERIPH 4.5X32MM: Type: IMPLANTABLE DEVICE | Site: SHOULDER | Status: FUNCTIONAL

## 2024-08-16 DEVICE — POST MOD UNIVERSPRVERS GLEN 20MM: Type: IMPLANTABLE DEVICE | Site: SHOULDER | Status: FUNCTIONAL

## 2024-08-16 DEVICE — ABSORBABLE HEMOSTAT (OXIDIZED REGENERATED CELLULOSE)
Type: IMPLANTABLE DEVICE | Site: SHOULDER | Status: FUNCTIONAL
Brand: SURGICEL

## 2024-08-16 DEVICE — CP SHLDR TOTL REV W/AUG: Type: IMPLANTABLE DEVICE | Site: SHOULDER | Status: FUNCTIONAL

## 2024-08-16 DEVICE — IMPLANTABLE DEVICE: Type: IMPLANTABLE DEVICE | Site: SHOULDER | Status: FUNCTIONAL

## 2024-08-16 RX ORDER — ACETAMINOPHEN 500 MG
1000 TABLET ORAL ONCE
Status: COMPLETED | OUTPATIENT
Start: 2024-08-16 | End: 2024-08-16

## 2024-08-16 RX ORDER — NALOXONE HCL 0.4 MG/ML
0.1 VIAL (ML) INJECTION
Status: CANCELLED | OUTPATIENT
Start: 2024-08-16

## 2024-08-16 RX ORDER — ONDANSETRON 2 MG/ML
INJECTION INTRAMUSCULAR; INTRAVENOUS AS NEEDED
Status: DISCONTINUED | OUTPATIENT
Start: 2024-08-16 | End: 2024-08-16 | Stop reason: SURG

## 2024-08-16 RX ORDER — ONDANSETRON 4 MG/1
4 TABLET, ORALLY DISINTEGRATING ORAL EVERY 6 HOURS PRN
Status: CANCELLED | OUTPATIENT
Start: 2024-08-16

## 2024-08-16 RX ORDER — ROPIVACAINE HYDROCHLORIDE 2 MG/ML
INJECTION, SOLUTION EPIDURAL; INFILTRATION; PERINEURAL
Status: DISCONTINUED
Start: 2024-08-16 | End: 2024-08-16 | Stop reason: HOSPADM

## 2024-08-16 RX ORDER — DEXMEDETOMIDINE HYDROCHLORIDE 100 UG/ML
INJECTION, SOLUTION INTRAVENOUS AS NEEDED
Status: DISCONTINUED | OUTPATIENT
Start: 2024-08-16 | End: 2024-08-16 | Stop reason: SURG

## 2024-08-16 RX ORDER — OXYCODONE HYDROCHLORIDE 5 MG/1
5 TABLET ORAL EVERY 6 HOURS PRN
Qty: 40 TABLET | Refills: 0 | Status: SHIPPED | OUTPATIENT
Start: 2024-08-16

## 2024-08-16 RX ORDER — DOCUSATE SODIUM 100 MG/1
100 CAPSULE, LIQUID FILLED ORAL 2 TIMES DAILY
Status: CANCELLED | OUTPATIENT
Start: 2024-08-16

## 2024-08-16 RX ORDER — EPHEDRINE SULFATE 50 MG/ML
INJECTION INTRAVENOUS AS NEEDED
Status: DISCONTINUED | OUTPATIENT
Start: 2024-08-16 | End: 2024-08-16 | Stop reason: SURG

## 2024-08-16 RX ORDER — PROPOFOL 10 MG/ML
VIAL (ML) INTRAVENOUS AS NEEDED
Status: DISCONTINUED | OUTPATIENT
Start: 2024-08-16 | End: 2024-08-16 | Stop reason: SURG

## 2024-08-16 RX ORDER — TRANEXAMIC ACID 10 MG/ML
1000 INJECTION, SOLUTION INTRAVENOUS ONCE
Status: COMPLETED | OUTPATIENT
Start: 2024-08-16 | End: 2024-08-16

## 2024-08-16 RX ORDER — FENTANYL CITRATE 50 UG/ML
50 INJECTION, SOLUTION INTRAMUSCULAR; INTRAVENOUS
Status: DISCONTINUED | OUTPATIENT
Start: 2024-08-16 | End: 2024-08-16 | Stop reason: HOSPADM

## 2024-08-16 RX ORDER — LIDOCAINE HYDROCHLORIDE 10 MG/ML
INJECTION, SOLUTION EPIDURAL; INFILTRATION; INTRACAUDAL; PERINEURAL AS NEEDED
Status: DISCONTINUED | OUTPATIENT
Start: 2024-08-16 | End: 2024-08-16 | Stop reason: SURG

## 2024-08-16 RX ORDER — ACETAMINOPHEN 500 MG
1000 TABLET ORAL 3 TIMES DAILY
Status: CANCELLED | OUTPATIENT
Start: 2024-08-16

## 2024-08-16 RX ORDER — SODIUM CHLORIDE 450 MG/100ML
50 INJECTION, SOLUTION INTRAVENOUS CONTINUOUS
Status: CANCELLED | OUTPATIENT
Start: 2024-08-16

## 2024-08-16 RX ORDER — DROPERIDOL 2.5 MG/ML
0.62 INJECTION, SOLUTION INTRAMUSCULAR; INTRAVENOUS ONCE AS NEEDED
Status: DISCONTINUED | OUTPATIENT
Start: 2024-08-16 | End: 2024-08-16 | Stop reason: HOSPADM

## 2024-08-16 RX ORDER — SODIUM CHLORIDE, SODIUM LACTATE, POTASSIUM CHLORIDE, CALCIUM CHLORIDE 600; 310; 30; 20 MG/100ML; MG/100ML; MG/100ML; MG/100ML
9 INJECTION, SOLUTION INTRAVENOUS CONTINUOUS
Status: DISCONTINUED | OUTPATIENT
Start: 2024-08-16 | End: 2024-08-16 | Stop reason: HOSPADM

## 2024-08-16 RX ORDER — CEFAZOLIN SODIUM 2 G/100ML
2000 INJECTION, SOLUTION INTRAVENOUS EVERY 8 HOURS
Status: CANCELLED | OUTPATIENT
Start: 2024-08-16 | End: 2024-08-17

## 2024-08-16 RX ORDER — ROCURONIUM BROMIDE 10 MG/ML
INJECTION, SOLUTION INTRAVENOUS AS NEEDED
Status: DISCONTINUED | OUTPATIENT
Start: 2024-08-16 | End: 2024-08-16 | Stop reason: SURG

## 2024-08-16 RX ORDER — HYDROMORPHONE HYDROCHLORIDE 1 MG/ML
0.5 INJECTION, SOLUTION INTRAMUSCULAR; INTRAVENOUS; SUBCUTANEOUS
Status: DISCONTINUED | OUTPATIENT
Start: 2024-08-16 | End: 2024-08-16 | Stop reason: HOSPADM

## 2024-08-16 RX ORDER — OXYCODONE HYDROCHLORIDE 5 MG/1
5 TABLET ORAL EVERY 4 HOURS PRN
Status: CANCELLED | OUTPATIENT
Start: 2024-08-16 | End: 2024-08-26

## 2024-08-16 RX ORDER — BUPROPION HYDROCHLORIDE 150 MG/1
150 TABLET ORAL EVERY MORNING
Status: CANCELLED | OUTPATIENT
Start: 2024-08-16

## 2024-08-16 RX ORDER — NEBIVOLOL 20 MG/1
20 TABLET ORAL
Status: CANCELLED | OUTPATIENT
Start: 2024-08-16

## 2024-08-16 RX ORDER — NIFEDIPINE 60 MG/1
60 TABLET, EXTENDED RELEASE ORAL DAILY
Status: CANCELLED | OUTPATIENT
Start: 2024-08-16

## 2024-08-16 RX ORDER — FINASTERIDE 5 MG/1
5 TABLET, FILM COATED ORAL DAILY
Status: CANCELLED | OUTPATIENT
Start: 2024-08-16

## 2024-08-16 RX ORDER — FAMOTIDINE 20 MG/1
20 TABLET, FILM COATED ORAL ONCE
Status: COMPLETED | OUTPATIENT
Start: 2024-08-16 | End: 2024-08-16

## 2024-08-16 RX ORDER — ASPIRIN 81 MG/1
81 TABLET ORAL DAILY
Status: CANCELLED | OUTPATIENT
Start: 2024-08-17

## 2024-08-16 RX ORDER — ONDANSETRON 2 MG/ML
4 INJECTION INTRAMUSCULAR; INTRAVENOUS EVERY 6 HOURS PRN
Status: CANCELLED | OUTPATIENT
Start: 2024-08-16

## 2024-08-16 RX ORDER — ZOLPIDEM TARTRATE 5 MG/1
10 TABLET ORAL NIGHTLY PRN
Status: CANCELLED | OUTPATIENT
Start: 2024-08-16

## 2024-08-16 RX ORDER — DEXAMETHASONE SODIUM PHOSPHATE 4 MG/ML
INJECTION, SOLUTION INTRA-ARTICULAR; INTRALESIONAL; INTRAMUSCULAR; INTRAVENOUS; SOFT TISSUE AS NEEDED
Status: DISCONTINUED | OUTPATIENT
Start: 2024-08-16 | End: 2024-08-16 | Stop reason: SURG

## 2024-08-16 RX ORDER — ROSUVASTATIN CALCIUM 10 MG/1
10 TABLET, COATED ORAL NIGHTLY
Status: CANCELLED | OUTPATIENT
Start: 2024-08-16

## 2024-08-16 RX ORDER — FENTANYL CITRATE 50 UG/ML
INJECTION, SOLUTION INTRAMUSCULAR; INTRAVENOUS
Status: COMPLETED
Start: 2024-08-16 | End: 2024-08-16

## 2024-08-16 RX ORDER — ROPIVACAINE HYDROCHLORIDE 2 MG/ML
INJECTION, SOLUTION EPIDURAL; INFILTRATION; PERINEURAL CONTINUOUS
Status: DISCONTINUED | OUTPATIENT
Start: 2024-08-16 | End: 2024-08-16 | Stop reason: HOSPADM

## 2024-08-16 RX ORDER — PREGABALIN 75 MG/1
75 CAPSULE ORAL ONCE
Status: COMPLETED | OUTPATIENT
Start: 2024-08-16 | End: 2024-08-16

## 2024-08-16 RX ORDER — ACETAMINOPHEN 325 MG/1
650 TABLET ORAL EVERY 4 HOURS PRN
Status: CANCELLED | OUTPATIENT
Start: 2024-08-16

## 2024-08-16 RX ORDER — MIDAZOLAM HYDROCHLORIDE 1 MG/ML
1 INJECTION INTRAMUSCULAR; INTRAVENOUS
Status: DISCONTINUED | OUTPATIENT
Start: 2024-08-16 | End: 2024-08-16 | Stop reason: HOSPADM

## 2024-08-16 RX ORDER — LIDOCAINE HYDROCHLORIDE 10 MG/ML
0.5 INJECTION, SOLUTION EPIDURAL; INFILTRATION; INTRACAUDAL; PERINEURAL ONCE AS NEEDED
Status: COMPLETED | OUTPATIENT
Start: 2024-08-16 | End: 2024-08-16

## 2024-08-16 RX ORDER — VANCOMYCIN HYDROCHLORIDE 1 G/20ML
INJECTION, POWDER, LYOPHILIZED, FOR SOLUTION INTRAVENOUS AS NEEDED
Status: DISCONTINUED | OUTPATIENT
Start: 2024-08-16 | End: 2024-08-16 | Stop reason: HOSPADM

## 2024-08-16 RX ADMIN — FAMOTIDINE 20 MG: 20 TABLET, FILM COATED ORAL at 08:29

## 2024-08-16 RX ADMIN — DEXMEDETOMIDINE HYDROCHLORIDE 4 MCG: 100 INJECTION, SOLUTION INTRAVENOUS at 09:51

## 2024-08-16 RX ADMIN — SODIUM CHLORIDE, POTASSIUM CHLORIDE, SODIUM LACTATE AND CALCIUM CHLORIDE 9 ML/HR: 600; 310; 30; 20 INJECTION, SOLUTION INTRAVENOUS at 08:29

## 2024-08-16 RX ADMIN — TRANEXAMIC ACID 1000 MG: 10 INJECTION, SOLUTION INTRAVENOUS at 11:19

## 2024-08-16 RX ADMIN — FENTANYL CITRATE 50 MCG: 50 INJECTION, SOLUTION INTRAMUSCULAR; INTRAVENOUS at 11:57

## 2024-08-16 RX ADMIN — PHENYLEPHRINE HYDROCHLORIDE 0.3 MCG/KG/MIN: 10 INJECTION INTRAVENOUS at 10:27

## 2024-08-16 RX ADMIN — ONDANSETRON 4 MG: 2 INJECTION INTRAMUSCULAR; INTRAVENOUS at 11:00

## 2024-08-16 RX ADMIN — EPHEDRINE SULFATE 10 MG: 50 INJECTION INTRAVENOUS at 10:14

## 2024-08-16 RX ADMIN — SUGAMMADEX 200 MG: 100 INJECTION, SOLUTION INTRAVENOUS at 11:29

## 2024-08-16 RX ADMIN — EPHEDRINE SULFATE 10 MG: 50 INJECTION INTRAVENOUS at 10:16

## 2024-08-16 RX ADMIN — ACETAMINOPHEN 1000 MG: 500 TABLET ORAL at 08:28

## 2024-08-16 RX ADMIN — TRANEXAMIC ACID 1000 MG: 10 INJECTION, SOLUTION INTRAVENOUS at 10:06

## 2024-08-16 RX ADMIN — PROPOFOL 200 MG: 10 INJECTION, EMULSION INTRAVENOUS at 09:52

## 2024-08-16 RX ADMIN — ROCURONIUM BROMIDE 50 MG: 10 INJECTION INTRAVENOUS at 09:52

## 2024-08-16 RX ADMIN — EPHEDRINE SULFATE 10 MG: 50 INJECTION INTRAVENOUS at 10:27

## 2024-08-16 RX ADMIN — DEXAMETHASONE SODIUM PHOSPHATE 4 MG: 4 INJECTION INTRA-ARTICULAR; INTRALESIONAL; INTRAMUSCULAR; INTRAVENOUS; SOFT TISSUE at 09:52

## 2024-08-16 RX ADMIN — Medication 1000 MG: at 11:52

## 2024-08-16 RX ADMIN — SODIUM CHLORIDE 2000 MG: 900 INJECTION INTRAVENOUS at 10:00

## 2024-08-16 RX ADMIN — LIDOCAINE HYDROCHLORIDE 0.5 ML: 10 INJECTION, SOLUTION EPIDURAL; INFILTRATION; INTRACAUDAL; PERINEURAL at 08:29

## 2024-08-16 RX ADMIN — PREGABALIN 75 MG: 75 CAPSULE ORAL at 08:29

## 2024-08-16 RX ADMIN — LIDOCAINE HYDROCHLORIDE 50 MG: 10 SOLUTION INTRAVENOUS at 09:52

## 2024-08-16 NOTE — ANESTHESIA PROCEDURE NOTES
"Peripheral Block      Patient reassessed immediately prior to procedure    Patient location during procedure: OR  Reason for block: at surgeon's request and post-op pain management  Performed by  CRNA/CAA: Roberto Mendez CRNA  Preanesthetic Checklist  Completed: patient identified, IV checked, site marked, risks and benefits discussed, surgical consent, monitors and equipment checked, pre-op evaluation and timeout performed  Prep:  Pt Position: supine  Sterile barriers:cap, gloves, mask and washed/disinfected hands  Prep: ChloraPrep  Patient monitoring: blood pressure monitoring, continuous pulse oximetry and EKG  Procedure  Performed under: general  Guidance:ultrasound guided and landmark technique  Images:still images obtained, printed/placed on chart    Laterality:left  Block Type:PECS I and PECS II  Injection Technique:single-shot  Needle Type:short-bevel  Needle Gauge:20 G  Resistance on Injection: none          Medications  Preservative Free Saline:10ml  Comment:Block Injection:  Total volume of LA divided between Right and Left sided blocks         Post Assessment  Injection Assessment: negative aspiration for heme, incremental injection and no paresthesia on injection  Patient Tolerance:comfortable throughout block  Complications:no  Additional Notes  Interpectoral-Pectoserratus Plane   A high-frequency linear transducer, with sterile cover, was placed medial to the coracoid process in the paramedian sagittal plane. The transducer was moved caudally to the 4th rib and rotated slightly to allow an in-plane needle trajectory from medial to lateral. Pectoralis Major Muscle (PMM), Pectoralis Minor Muscle (PmM), Thoracoacromial Artery, Ribs, and Pleura were identified under ultrasound. The insertion site was prepped in sterile fashion and then localized with 2-5 ml of 1% Lidocaine. Using ultrasound-guidance, a 20-gauge B-Randhawa 4\" Ultraplex 360 non-stimulating echogenic needle was advanced in plane until the tip " of the needle was in the fascial plane between the PMM and PmM, lateral to the Thoracoacromial Artery. 1-3ml of preservative free normal saline was used to hydro-dissect the fascial planes. After the fascial plane was verified, 10ml local anesthetic (LA) was injected for Interpectoral fascial plane block. The needle was continued along the same path to the level of the 4th rib below PmM.  Initially preservative free normal saline was used to confirm needle position and then 20 ml of LA was injected for Pectoserratus fascial plane block. Aspiration every 5 ml to prevent intravascular injection. Injection was completed with negative aspiration of blood and negative intravascular injection. Injection pressures were normal with minimal resistance.     Performed by: Roberto Mendez CRNA

## 2024-08-16 NOTE — OP NOTE
Operative Report Reverse Total Shoulder Arthroplasty     DATE OF OPERATION: 08/16/24     PREOPERATIVE DIAGNOSIS: left shoulder glenohumeral arthritis with medialization of glenoid      POSTOPERATIVE DIAGNOSES:  1. same     PROCEDURES PERFORMED:  1. left reverse total shoulder arthroplasty.          SURGEON: Benjy Lindo MD           Assistant: Dang Wong PA  ** Please note the physician assistant was medically necessary to assist with positioning retraction, arm positioning, care of soft tissues and closure      ANESTHESIA: General plus block.       ESTIMATED BLOOD LOSS:100mL.       COMPLICATIONS: None.       DISPOSITION: Recovery room in stable condition.      Arthrex Universe Reverse   Humeral Stem: size 9 short  Humeral Tray: 39 offset  Polyethylene Liner: 39+6, std  Baseplate: 28, 20 deg full wedge, 20mm pressfit post  Glenosphere: 39+4      INDICATIONS: This is a 64 yo female with left shoulder pain and limited function and motion secondary to above diagnosis. They have failed conservative treatment and after a discussion of risks, benefits, and alternatives, wished to proceed with shoulder arthroplasty.     DESCRIPTION OF PROCEDURE: On the day of surgery, the patient identified the left shoulder as the correct operative extremity. This was initialed by the surgeon with the patients's acknowledgment. The patient underwent placement of an interscalene block and was taken to the operating room and placed in the supine position. Upon induction of adequate anesthesia, the patient was brought up to the beach chair position and the shoulder and upper extremity were prepped and draped in the usual sterile fashion. Timeout confirmed the correct patient and operative extremity as well as that antibiotics were on board. A standard deltopectoral approach to the shoulder was carried out. It was carried sharply through the skin and subcutaneous tissue. Medial and lateral flaps were developed over the deltopectoral  fascia. The cephalic vein was identified and mobilized laterally with the deltoid. The subdeltoid and subpectoral spaces were mobilized and a blunt retractor was placed deep to this. The clavipectoral fascia was opened on the lateral edge of the conjoined tendon and the retractor was moved deep to this. The leading edge of the pectoralis was released exposing the long head of the biceps. This was tenosynovitic and therefore tenotomized. The 3 sisters were identified and coagulated. A subscapularis tenotomy was performed and rotator interval was released to the glenoid exposing the humeral head. The inferior capsule was released directly off the humerus to allow greater than 90° of external rotation. The anatomic neck was exposed and the humeral head osteotomy was performed in approximately 20° of retroversion. The remainder of the osteophytes were removed. The canal was then entered, reamed, and broached. The final stem impacted in in approximately 20° of retroversion. A head protector was placed. The humerus was subluxed posteriorly. The glenoid exposed. Circumferential labral excision and capsular release were performed. A  mobilization of the subscapularis was carried out as well.  A centering hole was drilled. The glenoid was gently reamed and then the  central hole for the baseplate was drilled  glenoid baseplate inserted.  Screws were then placed through the baseplate  The glenosphere was then inserted and locked into place with a set screw.  The humerus was carefully subluxed back anteriorly. A liner tray and polyethylene were placed and trialing was carried out. The appropriate final sizes were chosen and locked into place.  The shoulder was then reduced.  This allowed nearly full passive range of motion with no instability. The joint was copiously irrigated with orthopedic irrigation mixed with Betadine after the final implants were assembled and locked into place.      vancomycin powder was placed in the  wound       The deltopectoral interval was approximated with 0 Vicryl, the subcutaneous tissue with 2-0 Vicryl, and the skin with nylon. A sterile dressing was placed. Anesthesia was reversed and the patient was taken to the recovery room in stable condition. All instrument, needle, and sponge counts were correct.       Benjy Lindo MD, MS

## 2024-08-16 NOTE — DISCHARGE INSTRUCTIONS
InfuBLOCK - Patient Information    What is a pain pump?  The InfuBLOCK pump delivers post-operative, non-narcotic, numbing medication to the nerve near the surgical site for pain relief.     Where can I find information about my pain pump?           For more information about your pain pump, scan the QR code.  For additional patient resources, visit [email or web address].                                                                                               While your physician is your primary source for information about your treatment there may be times during your treatment that you need assistance with your infusion pump.     If you need assistance take the following steps:    The [employer] Hotline is Here for You 24/7.  Please call [phone] for the following concerns or complications:    Answers to questions about your infusion pump                 Tubing disconnect  Assistance with pump alarms                                                      Dislodged catheter  Excessive leakage noted from pump                                         Inadequate pain control    2.   [facility] Anesthesia Acute Pain Service: [phone] is available 24/7 for any further needs or concerns about medication or pain control.     -------------------------------------------------------------------------    Nerve Catheter Removal Instructions  When your device is empty:    Remove your catheter by pulling the dressing off slowly (like you would remove a regular bandage). The catheter should pull right out of the skin.  Check that the BLUE tip is intact.                                                                                     If the catheter is stuck, reposition your   extremity and pull slowly until removed.  *If catheter is HURTING and WON'T come out, stop and call [phone] for further assistance.    Remove medication bag from the black carrying case.  Cut the  tubing on right and left side of pump, and discard the medication bag and tubing into garbage.  Place the pump and black carrying case into the plastic bag and then place this into the return box.  Seal box with blue stickers and return to [employer] postal service. THIS IS PRE-PAID POSTAGE.        -------------------------------------------------------------------------    [facility] COLD THERAPY - PATIENT INSTRUCTION SHEET    Cold Compression Therapy for your comfort and rehabilitation  Your caregivers want you to be productive in your rehab and comfortable during your stay. In keeping with those goals, you will be receiving an SMI Cold Therapy Wrap to help ease post-operative pain and swelling that might keep you from getting back on track! Your SMI Cold Therapy Wrap is effective and simple-to-use, and you will be encouraged to apply it throughout your hospital stay and at home through the duration of your recovery.    When you are ready to go home  Be sure to take your SMI Cold Therapy Wrap and both sets of Gel Bags with you for continued comfort and use throughout your rehabilitation. If you don't already have them, ask your nurse or aide to retrieve your SMI Gel Bags from the patient freezer.    Home use precautions  Always follow your medical professional's application instructions upon discharge. Your SMI Cold Therapy Wrap and Gel Bags are designed to last for months following your surgery. Never heat the Gel Bags unless specified by your healthcare provider. Supervision is advised when using this product on children or geriatric patients. To avoid danger of suffocation, please keep the outer plastic packaging away from children & pets.    Cold Therapy Instructions  Place Gel Bags in a freezer set ¾ of the way to max temperature for at least (4) hours. For best results, lay the Gel Bags flat and [id number] in the freezer. Once frozen, slide Gel Bags into the gel pouch and secure your wrap to the affected area with the  straps.  Gel wraps that have been stored in a freezer for an extended period of time may require a (10) minute period of softening up in a room temperature environment before application.  The gel pouch acts as a protective barrier. NEVER place frozen bags directly onto skin, as this may cause frostbite injury.  The [facility] Cold Therapy Wrap is designed to be able to be worm while ambulating. The compression straps can be secured well enough so that the Wrap won't fall off while moving.  Wrap Application Videos can be viewed at [employer].  An additional protective barrier such as clothing, a washcloth, hand-towel or pillowcase may be used during prolonged treatment applications.  The Gel-Pouch and Wrap are both Latex-Free and the Gel Bag ingredients are non toxic.    [facility] Wrap care instructions  The [facility] Cold Therapy Wrap may be hand washed and hung to dry when needed.    [facility] re-order information  Additional [facility] body specific wraps and/or Gel Bags can be re-ordered from [employer] or call "[employer] ([phone])        InfuBLOCK - Patient Information    What is a pain pump?  The InfuBLOCK pump delivers post-operative, non-narcotic, numbing medication to the nerve near the surgical site for pain relief.     Where can I find information about my pain pump?           For more information about your pain pump, scan the QR code.  For additional patient resources, visit [email or web address].                                                                                               While your physician is your primary source for information about your treatment there may be times during your treatment that you need assistance with your infusion pump.     If you need assistance take the following steps:    The [employer] Hotline is Here for You 24/7.  Please call [phone] for the following concerns or complications:    Answers to questions about your infusion  pump                 Tubing disconnect  Assistance with pump alarms                                                      Dislodged catheter  Excessive leakage noted from pump                                         Inadequate pain control    2.   [facility] Anesthesia Acute Pain Service: [phone] is available 24/7 for any further needs or concerns about medication or pain control.     -------------------------------------------------------------------------    Nerve Catheter Removal Instructions  When your device is empty:    Remove your catheter by pulling the dressing off slowly (like you would remove a regular bandage). The catheter should pull right out of the skin.  Check that the BLUE tip is intact.                                                                                     If the catheter is stuck, reposition your   extremity and pull slowly until removed.  *If catheter is HURTING and WON'T come out, stop and call [phone] for further assistance.    Remove medication bag from the black carrying case.  Cut the tubing on right and left side of pump, and discard the medication bag and tubing into garbage.  Place the pump and black carrying case into the plastic bag and then place this into the return box.  Seal box with blue stickers and return to [place] postal service. THIS IS PRE-PAID POSTAGE.        -------------------------------------------------------------------------    [facility] COLD THERAPY - PATIENT INSTRUCTION SHEET    Cold Compression Therapy for your comfort and rehabilitation  Your caregivers want you to be productive in your rehab and comfortable during your stay. In keeping with those goals, you will be receiving an [facility] Cold Therapy Wrap to help ease post-operative pain and swelling that might keep you from getting back on track! Your SMI Cold Therapy Wrap is effective and simple-to-use, and you will be encouraged to apply it throughout your hospital stay and at home through the  duration of your recovery.    When you are ready to go home  Be sure to take your SMI Cold Therapy Wrap and both sets of Gel Bags with you for continued comfort and use throughout your rehabilitation. If you don't already have them, ask your nurse or aide to retrieve your SMI Gel Bags from the patient freezer.    Home use precautions  Always follow your medical professional's application instructions upon discharge. Your SMI Cold Therapy Wrap and Gel Bags are designed to last for months following your surgery. Never heat the Gel Bags unless specified by your healthcare provider. Supervision is advised when using this product on children or geriatric patients. To avoid danger of suffocation, please keep the outer plastic packaging away from children & pets.    Cold Therapy Instructions  Place Gel Bags in a freezer set ¾ of the way to max temperature for at least (4) hours. For best results, lay the Gel Bags flat and [id number] in the freezer. Once frozen, slide Gel Bags into the gel pouch and secure your wrap to the affected area with the straps.  Gel wraps that have been stored in a freezer for an extended period of time may require a (10) minute period of softening up in a room temperature environment before application.  The gel pouch acts as a protective barrier. NEVER place frozen bags directly onto skin, as this may cause frostbite injury.  The SMI Cold Therapy Wrap is designed to be able to be worm while ambulating. The compression straps can be secured well enough so that the Wrap won't fall off while moving.  Wrap Application Videos can be viewed at [email or web address].  An additional protective barrier such as clothing, a washcloth, hand-towel or pillowcase may be used during prolonged treatment applications.  The Gel-Pouch and Wrap are both Latex-Free and the Gel Bag ingredients are non toxic.    SMI Wrap care instructions  The SMI Cold Therapy Wrap may be hand washed and hung to dry when  needed.    [facility] re-order information  Additional [facility] body specific wraps and/or Gel Bags can be re-ordered from [email or web address] or call [phone] ([phone])

## 2024-08-16 NOTE — INTERVAL H&P NOTE
"[facility] Pre-op    Full history and physical note from office is attached.    VS: /79  HR 66  RR 16  T 98.4  Sat 98%RA  Ht 177.8 cm (70\")   Wt 107 kg (235 lb)   BMI 33.72 kg/m²     Immunizations:  Influenza:  UTD  Pneumococcal:  UTD  Tetanus:  UTD    Review of Systems:  Constitutional-- No fever, chills or sweats. No fatigue.  CV-- No chest pain, palpitation or syncope  Resp-- No SOB, cough, hemoptysis  Skin--No rashes or lesions    Physical Exam:  Heart:   Regular rate and rhythm, S1 and S2 normal  Lungs: Clear to auscultation bilaterally, respirations unlabored    LAB Results:  No results found for: \"WBC\", \"HGB\", \"HCT\", \"MCV\", \"PLT\", \"NEUTROABS\", \"GLUCOSE\", \"BUN\", \"CREATININE\", \"EGFRIFNONA\", \"EGFRIFAFRI\", \"NA\", \"K\", \"CL\", \"CO2\", \"MG\", \"PHOS\", \"CALCIUM\", \"ALBUMIN\", \"AST\", \"ALT\", \"BILITOT\", \"PTT\", \"INR\"    Cancer Staging (if applicable)  Cancer Patient: __ yes __no __unknown__N/A; If yes, clinical stage T:__ N:__M:__, stage group or __N/A      Impression: Left shoulder pain      Plan: TOTAL SHOULDER ARTHROPLASTY VERSES REVERSE TOTAL SHOULDER LEFT       Xiomy Sharma, APRN   8/16/2024   08:19 EDT  "

## 2024-08-16 NOTE — ANESTHESIA PREPROCEDURE EVALUATION
Anesthesia Evaluation     Patient summary reviewed and Nursing notes reviewed   no history of anesthetic complications:   NPO Solid Status: > 8 hours  NPO Liquid Status: > 2 hours           Airway   Mallampati: III  TM distance: >3 FB  Neck ROM: full  Possible difficult intubation  Dental - normal exam     Pulmonary - normal exam    breath sounds clear to auscultation  (+) ,sleep apnea on CPAP  Cardiovascular - normal exam  Exercise tolerance: good (4-7 METS)    Patient on routine beta blocker  Rhythm: regular  Rate: normal    (+) hypertension, hyperlipidemia      Neuro/Psych  (+) psychiatric history Anxiety and Depression  GI/Hepatic/Renal/Endo    (+) obesity, GERD, diabetes mellitus type 2    Musculoskeletal     (+) chronic pain  Abdominal    Substance History - negative use     OB/GYN          Other   arthritis,   history of cancer (Skin)    ROS/Med Hx Other: ASA81 - 8/1/24    Ozempic - 8/1/24                Anesthesia Plan    ASA 3     general with block     intravenous induction     Anesthetic plan, risks, benefits, and alternatives have been provided, discussed and informed consent has been obtained with: patient.    Plan discussed with CRNA.    CODE STATUS:

## 2024-08-16 NOTE — PROGRESS NOTES
Spoke with patient's wife while out of the OR on a break to inform her of the patient's dislodged  #9 incisor crown. I returned the crown to her in a sealed ziploc back and also left her with contact information for the anesthesia group office for any follow up needs. She was appreciative and stated that he does have problems with his crowns.

## 2024-08-16 NOTE — H&P
Patient Name: Antony Williamson  MRN: [id number]  : 1960  DOS: 2024    Attending: Benjy Lindo MD    Primary Care Provider: Toby Williamson MD      Chief complaint: Left shoulder pain    Subjective   Patient is a pleasant 63 y.o. male presented for scheduled surgery by Dr. Lindo  He underwent left reverse total shoulder arthroplasty under GA and a block, tolerated surgery well, was admitted for further management.  Patient denies any fever or chills  No chest pain no shortness of breath no nausea or vomiting       Allergies:  No Known Allergies    Meds:  Medications Prior to Admission   Medication Sig Dispense Refill Last Dose    acetaminophen (TYLENOL) 500 MG tablet Take 2 tablets by mouth 3 (Three) Times a Day.   2024 at 0400    buPROPion XL (WELLBUTRIN XL) 150 MG 24 hr tablet Take 1 tablet by mouth Every Morning.  1 2024 at 0400    BYSTOLIC 10 MG tablet Take 2 tablets by mouth Take As Directed. 20 mg morning and 10 mg at night  1 2024 at 0400    Cholecalciferol (Vitamin D) 50 MCG ( UT) capsule Take 1 capsule by mouth.   2024 at 0400    diphenhydrAMINE (BENADRYL) 25 mg capsule Take 1 capsule by mouth Every 6 (Six) Hours As Needed for Itching.   8/15/2024 at 1800    docusate sodium (COLACE) 100 MG capsule Take 1 capsule by mouth 2 (Two) Times a Day.   8/15/2024 at 1800    dutasteride (AVODART) 0.5 MG capsule Take 1 capsule by mouth Daily.  11 2024 at 0400    hydrochlorothiazide (HYDRODIURIL) 25 MG tablet Take 1 tablet by mouth Daily.  1 2024 at 0400    metFORMIN ER (GLUCOPHAGE-XR) 500 MG 24 hr tablet Take 1 tablet by mouth Daily.  1 8/15/2024 at 1800    NIFEdipine XL (PROCARDIA XL) 60 MG 24 hr tablet Take 1 tablet by mouth Daily.   2024 at 0400    rosuvastatin (CRESTOR) 10 MG tablet Take 1 tablet by mouth Every Night.   8/15/2024 at 1800    telmisartan (MICARDIS) 80 MG tablet Take 1 tablet by mouth Daily.   2024 at 0400    traMADol (ULTRAM) 50 MG  "tablet Take 1 tablet by mouth Every 12 (Twelve) Hours As Needed for Moderate Pain.   8/15/2024 at 1800    zolpidem (AMBIEN) 5 MG tablet Take 2 tablets by mouth At Night As Needed for Sleep.   8/15/2024 at 1800    Aspirin Adult Low Strength 81 MG EC tablet  (Patient not taking: Reported on 8/9/2024)   8/1/2024    Ozempic, 1 MG/DOSE, 4 MG/3ML solution pen-injector INJECT 1MG SUB-Q ONCE WEEKLY. (Patient not taking: Reported on 8/9/2024)   8/1/2024         History:   Past Medical History:   Diagnosis Date    Arthritis     BPH (benign prostatic hyperplasia)     Cancer     non melanoma multiple    Diabetes     GERD (gastroesophageal reflux disease)     High cholesterol     Hypertension     Sleep apnea     Wears hearing aid in both ears     Wears reading eyeglasses      Past Surgical History:   Procedure Laterality Date    COLONOSCOPY      EYE SURGERY Bilateral     cataracts    HERNIA REPAIR      right inguinal as infant    SHOULDER SURGERY Left 2008    arthroscopy    SKIN BIOPSY       Family History   Problem Relation Age of Onset    Cancer Mother     Heart disease Father      Social History     Tobacco Use    Smoking status: Never     Passive exposure: Past    Smokeless tobacco: Never   Substance Use Topics    Alcohol use: No    Drug use: No       Review of Systems  Pertinent items are noted in HPI, all other systems reviewed and negative    Vital Signs  /77 (BP Location: Right arm, Patient Position: Lying)   Pulse 67   Temp 98 °F (36.7 °C) (Oral)   Resp 16   Ht 177.8 cm (70\")   Wt 107 kg (235 lb)   SpO2 95%   BMI 33.72 kg/m²     Physical Exam:    General Appearance:    Alert, cooperative, in no acute distress   Head:    Normocephalic, without obvious abnormality, atraumatic   Eyes:            Lids and lashes normal, conjunctivae and sclerae normal, no   icterus, no pallor, corneas clear,    Ears:    Ears appear intact with no abnormalities noted   Throat:   No oral lesions, no thrush, oral mucosa moist " "  Neck:   No adenopathy, supple, trachea midline, no thyromegaly         Lungs:     Clear to auscultation,respirations regular, even and                   unlabored    Heart:    Regular rhythm and normal rate, normal S1 and S2, no      murmur    Abdomen:     Normal bowel sounds, no masses, no organomegaly, soft        non-tender, non-distended, no guarding, no rebound                 tenderness   Genitalia:    Deferred   Extremities: Left UE in a sling, CDI dressing shoulder. Interscalene nerve block cath present.  Distal pulses, cap refill, movements of fingers, wrist, intact.     Pulses:   Pulses palpable and equal bilaterally   Skin:   No bleeding, bruising or rash   Neurologic:   Cranial nerves 2 - 12 grossly intact      I reviewed the patient's new clinical results.             Invalid input(s): \"NEUTOPHILPCT\"  Results from last 7 days   Lab Units 08/16/24  0830   POTASSIUM mmol/L 4.3     No results found for: \"HGBA1C\"        Assessment and Plan:       Glenohumeral arthritis, left    Benign prostatic hyperplasia with urinary obstruction    Type 2 diabetes mellitus with hyperglycemia    Mixed hyperlipidemia    Insomnia    Essential hypertension      Plan    1. PT/OT. NWB, left UE, ROM hand, wrist, elbow.  2. Pain control-prns, interscalene nerve block cath with ropivacaine infusion.   3. IS-encourage  4. DVT proph- Mech/ mobilize.  5. Bowel regimen  6. Resume home medications as appropriate  7. Monitor post-op labs  8. DC planning home today if he cleared by OT    Hypertension  Blood pressure control  Continue Bystolic and nifedipine  Hold HCTZ and Micardis   monitor blood pressure closely    Diabetes mellitus  Hold metformin  Patient stopped Ozempic for a month  Start short acting insulin per sliding scale  Monitor blood glucose closely    Hypercholesterolemia  Continue Crestor    Addendum  Patient was cleared by OT and he was stable for discharge home to follow up with orthopedic      Dragon disclaimer:  Part " of this encounter note is an electronic transcription/translation of spoken language to printed text. The electronic translation of spoken language may permit erroneous, or at times, nonsensical words or phrases to be inadvertently transcribed; Although I have reviewed the note for such errors, some may still exist.    Dae Franco MD  08/16/24  12:54 EDT

## 2024-08-16 NOTE — ANESTHESIA PROCEDURE NOTES
Peripheral Block      Patient reassessed immediately prior to procedure    Patient location during procedure: pre-op  Reason for block: at surgeon's request and post-op pain management  Performed by  CRNA/CAA: Roberto Mendez CRNA  Preanesthetic Checklist  Completed: patient identified, IV checked, site marked, risks and benefits discussed, surgical consent, monitors and equipment checked, pre-op evaluation and timeout performed  Prep:  Sterile barriers:cap, gloves, mask and washed/disinfected hands  Prep: ChloraPrep  Patient monitoring: blood pressure monitoring, continuous pulse oximetry and EKG  Procedure    Sedation: yes  Performed under: local infiltration  Guidance:ultrasound guided    ULTRASOUND INTERPRETATION.  Using ultrasound guidance a 20 G gauge needle was placed in close proximity to the nerve, at which point, under ultrasound guidance anesthetic was injected in the area of the nerve and spread of the anesthesia was seen on ultrasound in close proximity thereto.  There were no abnormalities seen on ultrasound; a digital image was taken; and the patient tolerated the procedure with no complications. Images:still images obtained, printed/placed on chart    Block Type:interscalene  Injection Technique:catheter  Needle Type:Tuohy and echogenic  Needle Gauge:18 G  Resistance on Injection: none  Catheter Size:20 G (20g)  Cath Depth at skin: 10 cm          Post Assessment  Injection Assessment: negative aspiration for heme, no paresthesia on injection and incremental injection  Patient Tolerance:comfortable throughout block  Complications:no  Additional Notes  CATHETER  A high-frequency linear transducer, with sterile cover, was placed in the supraclavicular fossa to identify the subclavian artery and trunks and divisions of the brachial plexus. The transducer was then moved in a cephalad orientation with a slight rotation to continue visualization of the brachial plexus from the trunks and divisions, on to  "the C5-C7 roots. The insertion site was prepped and draped in sterile fashion. Skin and cutaneous tissue was infiltrated with 2-5 ml of 1% Lidocaine. Using ultrasound-guidance, an 18-gauge Contiplex Ultra 360 Touhy needle was advanced in plane from lateral to medial. Preservative-free normal saline was utilized for hydro-dissection of tissue, advancement of Touhy, and to confirm final needle placement at the fascial plane between the middle scalene muscle and sheath of the brachial plexus (C5-C7). A 20-gauge Contiplex Echo catheter was placed through the needle and advance out the tip of the Touhy 3-5 cm with the \"Aguilera Flip\". The Touhy needle was then removed, and final catheter position verified lateral to the brachial plexus with local anesthetic (LA) and ultrasound visualization. The catheter was secured in the usual fashion with skin glue, benzoin, steri-strips, CHG tegaderm and label noting \"Nerve Block Catheter\". Jerk tape applied at yellow connector and catheter connection. All LA was injected in increments of 3-5 ml after catheter secured. Aspiration every 5 ml to prevent intravascular injection. Injection was completed with negative aspiration of blood and negative intravascular injection. Injection pressures were normal with minimal resistance.   Performed by: Roberto Mendez CRNA            "

## 2024-08-16 NOTE — ANESTHESIA PROCEDURE NOTES
Airway  Urgency: elective    Date/Time: 8/16/2024 9:54 AM  Difficult airway    General Information and Staff    Patient location during procedure: OR  CRNA/CAA: Devan Alfredo, CRNA    Indications and Patient Condition  Indications for airway management: airway protection    Preoxygenated: yes  MILS not maintained throughout  Mask difficulty assessment: 3 - difficult mask (inadequate, unstable or two providers) +/- NMBA    Final Airway Details  Final airway type: endotracheal airway      Successful airway: ETT  Cuffed: yes   Successful intubation technique: video laryngoscopy  Facilitating devices/methods: Bougie  Endotracheal tube insertion site: oral  Blade: Palacios  Blade size: 4  ETT size (mm): 7.5  Cormack-Lehane Classification: grade IIb - view of arytenoids or posterior of glottis only (Gr 2b view with Palacios, extremely anterior airway position.)  Placement verified by: chest auscultation and capnometry   Cuff volume (mL): 8  Measured from: lips  ETT/EBT  to lips (cm): 22  Number of attempts at approach: 1  Assessment: #9 cap loosened inadvertently during laryngoscopy. Removed from underlying tooth and preserved. Will speak with patient and family postoperatively.    Additional Comments  Negative epigastric sounds, Breath sound equal bilaterally with symmetric chest rise and fall

## 2024-08-16 NOTE — PLAN OF CARE
Goal Outcome Evaluation:  Plan of Care Reviewed With: patient, spouse        Progress: no change  Outcome Evaluation: OT evaluation completed. The pt and spouse were educated on L shoulder precautions, ADL retraining, sling management, nerve catheter care, and LUE HEP within MD parameters. The pt completed UBD with SBA from therapist, all other assistance provided from spouse. The pt ambulated 150' with SBA, no AD. Pt's O2 remained >90% on RA. Pt tolerated LUE HEP PROM/AAROM well. The pt is cleared for a discharge home with assistance from family from an OT perspective.      Anticipated Discharge Disposition (OT): home with assist

## 2024-08-16 NOTE — ANESTHESIA POSTPROCEDURE EVALUATION
Patient: Antony Williamson    Procedure Summary       Date: 08/16/24 [facility]    Anesthesia Start: 0946 Anesthesia Stop: 1144    Procedure: REVERSE TOTAL SHOULDER LEFT (Left: Shoulder) Diagnosis:       Glenohumeral arthritis, left      (glenohumeral arthritis)    Surgeons: Benjy Lindo MD Provider: Darius Preston MD    Anesthesia Type: general with block ASA Status: 3            Anesthesia Type: general with block    Vitals  No vitals data found for the desired time range.          Post Anesthesia Care and Evaluation    Patient location during evaluation: PACU  Patient participation: complete - patient participated  Level of consciousness: agitated, awake and anxious  Pain management: adequate    Airway patency: patent  Anesthetic complications: No anesthetic complications  PONV Status: none  Cardiovascular status: hemodynamically stable and acceptable  Respiratory status: nonlabored ventilation, acceptable and nasal cannula  Hydration status: acceptable

## 2024-08-16 NOTE — THERAPY EVALUATION
Patient Name: Antony Williamson  : 1960    MRN: [id number]                              Today's Date: 2024       Admit Date: 2024    Visit Dx:     ICD-10-CM ICD-9-CM   1. Glenohumeral arthritis, left  M19.012 716.91     Patient Active Problem List   Diagnosis    Glenohumeral arthritis, left    Benign prostatic hyperplasia with urinary obstruction    Type 2 diabetes mellitus with hyperglycemia    Mixed hyperlipidemia    Insomnia    Essential hypertension     Past Medical History:   Diagnosis Date    Arthritis     BPH (benign prostatic hyperplasia)     Cancer     non melanoma multiple    Diabetes     GERD (gastroesophageal reflux disease)     High cholesterol     Hypertension     Sleep apnea     Wears hearing aid in both ears     Wears reading eyeglasses      Past Surgical History:   Procedure Laterality Date    COLONOSCOPY      EYE SURGERY Bilateral     cataracts    HERNIA REPAIR      right inguinal as infant    SHOULDER SURGERY Left 2008    arthroscopy    SKIN BIOPSY        General Information       Row Name 24 1424          OT Time and Intention    Document Type evaluation  -KF     Mode of Treatment occupational therapy;individual therapy  -KF       Row Name 24 1424          General Information    Patient Profile Reviewed yes  -KF     Prior Level of Function independent:;all household mobility;community mobility;bed mobility;ADL's;driving;work  Independent prior, no AD. 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  -KF     Existing Precautions/Restrictions fall;non-weight bearing;shoulder  s/p L rTSA, NWB LUE in don[place]on II with abduction pillow  -KF     Barriers to Rehab none identified  -KF       Row Name 24 1424          Occupational Profile    Reason for Services/Referral (Occupational Profile) Pt caregiver training was provided face-to-face on strategies and techniques related to activities of daily living, transfers, mobility, home safety, durable medical equipment, and brace  management for 31 minutes without the patient present.  -       Row Name 08/16/24 1424          Living Environment    People in Home spouse  -       Row Name 08/16/24 1424          Home Main Entrance    Number of Stairs, Main Entrance three  -KF     Stair Railings, Main Entrance none  -       Row Name 08/16/24 1424          Stairs Within Home, Primary    Stairs, Within Home, Primary Pt is able to remain on the main level of his home.  -       Row Name 08/16/24 1424          Cognition    Orientation Status (Cognition) oriented x 4  -       Row Name 08/16/24 1424          Safety Issues, Functional Mobility    Safety Issues Affecting Function (Mobility) safety precaution awareness  -     Impairments Affecting Function (Mobility) grasp;motor control;sensation/sensory awareness  -               User Key  (r) = Recorded By, (t) = Taken By, (c) = Cosigned By      Initials Name Provider Type    KF Sierra Tony OT Occupational Therapist                     Mobility/ADL's       Row Name 08/16/24 1425          Bed Mobility    Bed Mobility supine-sit  -     Supine-Sit [place] (Bed Mobility) standby assist  -     Assistive Device (Bed Mobility) head of bed elevated  -     Comment, (Bed Mobility) Pt educated on LUE NWB and safe sleeping positions. Pt states he plans to sleep in a recliner during initial recovery.  -       Row Name 08/16/24 1425          Transfers    Transfers sit-stand transfer;stand-sit transfer  -[facility] Name 08/16/24 1425          Sit-Stand Transfer    Sit-Stand [place] (Transfers) standby assist  -     Comment, (Sit-Stand Transfer) STS x3 completed from EOB  -[facility] Name 08/16/24 1425          Stand-Sit Transfer    Stand-Sit [place] (Transfers) standby assist  -[facility] Name 08/16/24 1425          Functional Mobility    Functional Mobility- Ind. Level standby assist  -     Functional Mobility- Device other (see comments)  no AD  -KF     Functional  Mobility-Distance (Feet) 150  -     Functional Mobility- Comment Pt passed mobility screen, no need for IP PT. Pt ambulated 150' and completed 3 steps to SBA, no AD. Pt's O2 remained >90% on RA.  -     Patient was able to Ambulate yes  -[facility] Name 08/16/24 1425          Activities of Daily Living    BADL Assessment/Intervention bathing;upper body dressing;lower body dressing  -[facility] Name 08/16/24 1427          Mobility    Extremity Weight-bearing Status left upper extremity  -     Left Upper Extremity (Weight-bearing Status) non weight-bearing (NWB)   -[facility] Name 08/16/24 142          Upper Body Dressing Assessment/Training    [place] Level (Upper Body Dressing) don;front opening garment;other (see comments);standby assist;verbal cues;nonverbal cues (demo/gesture)  sling  -     Comment, (Upper Body Dressing) Patient educated on L shoulder precautions, ADL retraining and sling management. Patient educated on nerve catheter care to avoid dislodgement. Pt and spouse able to perform UBD and sling management with SBA and cueing for sequence/technique.  -[facility] Name 08/16/24 142          Bathing Assessment/Intervention    Comment, (Bathing) Patient educated on L shoulder precautions with axilla care. Patient educated that nerve catheter cannot get wet.  -[facility] Name 08/16/24 1426          Lower Body Dressing Assessment/Training    [place] Level (Lower Body Dressing) don;pants/bottoms;socks;shoes/slippers;moderate assist (50% patient effort)  -     Position (Lower Body Dressing) unsupported sitting;supported standing  -     Comment, (Lower Body Dressing) Pt able to complete LBD with all assistance provided by spouse.  -               User Key  (r) = Recorded By, (t) = Taken By, (c) = Cosigned By      Initials Name Provider Type     Sierra Tony OT Occupational Therapist                   Obj/Interventions       [facility] Name 08/16/24 1422          Sensory  Assessment (Somatosensory)    Sensory Assessment LUE numbness/tingling  -       Row Name 08/16/24 1429          Vision Assessment/Intervention    Visual Impairment/Limitations corrective lenses for reading  -[facility] Name 08/16/24 1429          Range of Motion Comprehensive    General Range of Motion upper extremity range of motion deficits identified  -     Comment, General Range of Motion L shoulder NT, elbow/wrist/hand AAROM WFL; RUE WFL  -[facility] Name 08/16/24 1429          Strength Comprehensive (MMT)    General Manual Muscle Testing (MMT) Assessment upper extremity strength deficits identified  -     Comment, General Manual Muscle Testing (MMT) Assessment LUE NT; RUE grossly 4+/5 based on functional assessment  -[facility] Name 08/16/24 1429          Elbow/Forearm (Therapeutic Exercise)    Elbow/Forearm (Therapeutic Exercise) PROM (passive range of motion)  -     Elbow/Forearm PROM (Therapeutic Exercise) left;flexion;extension;supination;pronation;sitting;10 repetitions  -[facility] Name 08/16/24 1429          Wrist (Therapeutic Exercise)    Wrist (Therapeutic Exercise) AAROM (active assistive range of motion)  -     Wrist AAROM (Therapeutic Exercise) left;flexion;extension;10 repetitions  -[facility] Name 08/16/24 1429          Hand (Therapeutic Exercise)    Hand (Therapeutic Exercise) AROM (active range of motion)  -     Hand AROM/AAROM (Therapeutic Exercise) left;AROM (active range of motion);finger flexion;finger extension;10 repetitions  -[facility] Name 08/16/24 1429          Motor Skills    Therapeutic Exercise elbow/forearm;wrist;hand;other (see comments)  Pt and spouse educated on LUE HEP within MD parameters.  -[facility] Name 08/16/24 1429          Balance    Balance Assessment sitting static balance;sitting dynamic balance;sit to stand dynamic balance;standing static balance;standing dynamic balance  -     Static Sitting Balance independent  -     Dynamic Sitting  Balance supervision  -KF     Position, Sitting Balance unsupported;sitting edge of bed  -KF     Sit to Stand Dynamic Balance standby assist  -KF     Static Standing Balance standby assist  -KF     Dynamic Standing Balance standby assist  -KF     Position/Device Used, Standing Balance unsupported  -KF     Balance Interventions sitting;standing;sit to stand;supported;static;dynamic;occupation based/functional task  -KF     Comment, Balance No overt LOB  -KF               User Key  (r) = Recorded By, (t) = Taken By, (c) = Cosigned By      Initials Name Provider Type    KF Sierra Tony, OT Occupational Therapist                   Goals/Plan       Row Name 08/16/24 1434          Transfer Goal 1 (OT)    Activity/Assistive Device (Transfer Goal 1, OT) commode;bed-to-chair/chair-to-bed  -KF     [place] Level/Cues Needed (Transfer Goal 1, OT) supervision required  -KF     Time Frame (Transfer Goal 1, OT) long term goal (LTG);10 days  -KF     Progress/Outcome (Transfer Goal 1, OT) goal ongoing  -KF       Row Name 08/16/24 1434          Dressing Goal 1 (OT)    Activity/Device (Dressing Goal 1, OT) upper body dressing  -KF     [place]/Cues Needed (Dressing Goal 1, OT) minimum assist (75% or more patient effort)  -KF     Time Frame (Dressing Goal 1, OT) short term goal (STG);5 days  -KF     Progress/Outcome (Dressing Goal 1, OT) goal ongoing  -KF       Row Name 08/16/24 1434          ROM Goal 1 (OT)    ROM Goal 1 (OT) Pt will completed LUE HEP within MD parameters without assistance.  -KF     Time Frame (ROM Goal 1, OT) long term goal (LTG);10 days  -KF     Progress/Outcome (ROM Goal 1, OT) goal ongoing  -KF       Row Name 08/16/24 1434          Therapy Assessment/Plan (OT)    Planned Therapy Interventions (OT) activity tolerance training;adaptive equipment training;BADL retraining;functional balance retraining;occupation/activity based interventions;patient/caregiver education/training;ROM/therapeutic  exercise;strengthening exercise;transfer/mobility retraining  -               User Key  (r) = Recorded By, (t) = Taken By, (c) = Cosigned By      Initials Name Provider Type    Sierra Barney, NELIDA Occupational Therapist                   Clinical Impression       Row Name 08/16/24 1432          Pain Assessment    Pretreatment Pain Rating 0/10 - no pain  -KF     Posttreatment Pain Rating 0/10 - no pain  -KF       Row Name 08/16/24 1432          Plan of Care Review    Plan of Care Reviewed With patient;spouse  -     Progress no change  -KF     Outcome Evaluation OT evaluation completed. The pt and spouse were educated on L shoulder precautions, ADL retraining, sling management, nerve catheter care, and LUE HEP within MD parameters. The pt completed UBD with SBA from therapist, all other assistance provided from spouse. The pt ambulated 150' with SBA, no AD. Pt's O2 remained >90% on RA. Pt tolerated LUE HEP PROM/AAROM well. The pt is cleared for a discharge home with assistance from family from an OT perspective.  -       Row Name 08/16/24 1432          Therapy Assessment/Plan (OT)    Patient/Family Therapy Goal Statement (OT) Return to PLOF  -KF     Rehab Potential (OT) good, to achieve stated therapy goals  -     Criteria for Skilled Therapeutic Interventions Met (OT) yes;skilled treatment is necessary  -     Therapy Frequency (OT) daily  -KF     Predicted Duration of Therapy Intervention (OT) 2 days  -       Row Name 08/16/24 1432          Therapy Plan Review/Discharge Plan (OT)    Anticipated Discharge Disposition (OT) home with assist  -[facility] Name 08/16/24 1432          Vital Signs    Pre Systolic BP Rehab 145  -KF     Pre Treatment Diastolic BP 94  -KF     Pre SpO2 (%) 95  -KF     O2 Delivery Pre Treatment room air  -KF     Intra SpO2 (%) 95  -KF     O2 Delivery Intra Treatment room air  -KF     Post SpO2 (%) 96  -KF     O2 Delivery Post Treatment room air  -KF     Pre Patient Position  Supine  -KF     Intra Patient Position Standing  -KF     Post Patient Position Sitting  -KF       Row Name 08/16/24 1432          Positioning and Restraints    Pre-Treatment Position in bed  -KF     Post Treatment Position bed  -KF     In Bed notified nsg;sitting EOB;encouraged to call for assist;with family/caregiver;with nsg  -KF               User Key  (r) = Recorded By, (t) = Taken By, (c) = Cosigned By      Initials Name Provider Type    Sierra Barney OT Occupational Therapist                   Outcome Measures       Row Name 08/16/24 1435          How much help from another is currently needed...    Putting on and taking off regular lower body clothing? 2  -KF     Bathing (including washing, rinsing, and drying) 2  -KF     Toileting (which includes using toilet bed pan or urinal) 4  -KF     Putting on and taking off regular upper body clothing 2  -KF     Taking care of personal grooming (such as brushing teeth) 4  -KF     Eating meals 4  -KF     AM-PAC 6 Clicks Score (OT) 18  -KF       Row Name 08/16/24 1435          How much help from another person do you currently need...    Turning from your back to your side while in flat bed without using bedrails? 4  -KF     Moving from lying on back to sitting on the side of a flat bed without bedrails? 3  -KF     Moving to and from a bed to a chair (including a wheelchair)? 3  -KF     Standing up from a chair using your arms (e.g., wheelchair, bedside chair)? 3  -KF     Climbing 3-5 steps with a railing? 3  -KF     To walk in hospital room? 3  -KF     AM-PAC 6 Clicks Score (PT) 19  -KF     Highest Level of Mobility Goal 6 --> Walk 10 steps or more  -KF       Row Name 08/16/24 1435          Functional Assessment    Outcome Measure Options AM-PAC 6 Clicks Basic Mobility (PT);AM-PAC 6 Clicks Daily Activity (OT)  -KF               User Key  (r) = Recorded By, (t) = Taken By, (c) = Cosigned By      Initials Name Provider Type    Sierra Barney OT Occupational  Therapist                    Occupational Therapy Education       Title: PT OT SLP Therapies (Done)       Topic: Occupational Therapy (Done)       Point: ADL training (Done)       Description:   Instruct learner(s) on proper safety adaptation and remediation techniques during self care or transfers.   Instruct in proper use of assistive devices.                  Learning Progress Summary             Patient Acceptance, E,TB, VU,DU by  at 8/16/2024 1350                         Point: Home exercise program (Done)       Description:   Instruct learner(s) on appropriate technique for monitoring, assisting and/or progressing therapeutic exercises/activities.                  Learning Progress Summary             Patient Acceptance, E,TB, VU,DU by  at 8/16/2024 1350                         Point: Precautions (Done)       Description:   Instruct learner(s) on prescribed precautions during self-care and functional transfers.                  Learning Progress Summary             Patient Acceptance, E,TB, VU,DU by  at 8/16/2024 1350                         Point: Body mechanics (Done)       Description:   Instruct learner(s) on proper positioning and spine alignment during self-care, functional mobility activities and/or exercises.                  Learning Progress Summary             Patient Acceptance, E,TB, VU,DU by  at 8/16/2024 1350                                         User Key       Initials Effective Dates Name Provider Type Discipline     08/09/23 -  Sierra Tony, OT Occupational Therapist OT                  OT Recommendation and Plan  Planned Therapy Interventions (OT): activity tolerance training, adaptive equipment training, BADL retraining, functional balance retraining, occupation/activity based interventions, patient/caregiver education/training, ROM/therapeutic exercise, strengthening exercise, transfer/mobility retraining  Therapy Frequency (OT): daily  Plan of Care Review  Plan of Care  Reviewed With: patient, spouse  Progress: no change  Outcome Evaluation: OT evaluation completed. The pt and spouse were educated on L shoulder precautions, ADL retraining, sling management, nerve catheter care, and LUE HEP within MD parameters. The pt completed UBD with SBA from therapist, all other assistance provided from spouse. The pt ambulated 150' with SBA, no AD. Pt's O2 remained >90% on RA. Pt tolerated LUE HEP PROM/AAROM well. The pt is cleared for a discharge home with assistance from family from an OT perspective.     Time Calculation:   Evaluation Complexity (OT)  Review Occupational Profile/Medical/Therapy History Complexity: brief/low complexity  Assessment, Occupational Performance/Identification of Deficit Complexity: 1-3 performance deficits  Clinical Decision Making Complexity (OT): problem focused assessment/low complexity  Overall Complexity of Evaluation (OT): low complexity     Time Calculation- OT       Row Name 08/16/24 1436             Time Calculation- OT    OT Start Time 1350  -KF      OT Received On 08/16/24  -KF      OT Goal Re-Cert Due Date 08/26/24  -KF         Timed Charges    96559 - OT Therapeutic Exercise Minutes 8  -KF      08179 - OT Therapeutic Activity Minutes 5  -KF      33735 - OT Self Care/Mgmt Minutes 10  -KF         Untimed Charges    OT Eval/Re-eval Minutes 32  -KF         Total Minutes    Timed Charges Total Minutes 23  -KF      Untimed Charges Total Minutes 32  -KF       Total Minutes 55  -KF                User Key  (r) = Recorded By, (t) = Taken By, (c) = Cosigned By      Initials Name Provider Type    KF Sierra Tony OT Occupational Therapist                  Therapy Charges for Today       Code Description Service Date Service Provider Modifiers Qty    47899971484 HC OT EVAL LOW COMPLEXITY 3 8/16/2024 Sierra Tony OT GO 1    19077556433 HC OT THER PROC EA 15 MIN 8/16/2024 Sierra Tony OT GO 1    49079201855 HC OT SELF CARE/MGMT/TRAIN EA 15 MIN 8/16/2024  Sierra Tony, OT GO 1    17617874536 HC CAREGIVER TRAINING STRATEGIES & TQ 1ST 30 MINUTES 8/16/2024 Sierra Tony, OT  1                 Sierra Tony, OT  8/16/2024

## 2024-09-10 ENCOUNTER — OFFICE VISIT (OUTPATIENT)
Dept: CARDIOLOGY | Facility: CLINIC | Age: 64
End: 2024-09-10
Payer: COMMERCIAL

## 2024-09-10 VITALS
DIASTOLIC BLOOD PRESSURE: 78 MMHG | WEIGHT: 254 LBS | HEART RATE: 73 BPM | SYSTOLIC BLOOD PRESSURE: 132 MMHG | HEIGHT: 70 IN | BODY MASS INDEX: 36.36 KG/M2

## 2024-09-10 DIAGNOSIS — G47.30 SLEEP APNEA IN ADULT: ICD-10-CM

## 2024-09-10 DIAGNOSIS — N13.8 BENIGN PROSTATIC HYPERPLASIA WITH URINARY OBSTRUCTION: ICD-10-CM

## 2024-09-10 DIAGNOSIS — E11.9 TYPE 2 DIABETES MELLITUS WITHOUT COMPLICATION, WITHOUT LONG-TERM CURRENT USE OF INSULIN: ICD-10-CM

## 2024-09-10 DIAGNOSIS — R01.1 CARDIAC MURMUR: ICD-10-CM

## 2024-09-10 DIAGNOSIS — E78.00 HYPERCHOLESTEROLEMIA: ICD-10-CM

## 2024-09-10 DIAGNOSIS — E88.810 METABOLIC SYNDROME: ICD-10-CM

## 2024-09-10 DIAGNOSIS — N40.1 BENIGN PROSTATIC HYPERPLASIA WITH URINARY OBSTRUCTION: ICD-10-CM

## 2024-09-10 DIAGNOSIS — I25.6 SILENT MYOCARDIAL ISCHEMIA: ICD-10-CM

## 2024-09-10 DIAGNOSIS — I10 ESSENTIAL HYPERTENSION: Primary | ICD-10-CM

## 2024-09-10 PROBLEM — E78.2 MIXED HYPERLIPIDEMIA: Status: RESOLVED | Noted: 2023-03-19 | Resolved: 2024-09-10

## 2024-09-10 PROCEDURE — 93000 ELECTROCARDIOGRAM COMPLETE: CPT | Performed by: INTERNAL MEDICINE

## 2024-09-10 PROCEDURE — 99204 OFFICE O/P NEW MOD 45 MIN: CPT | Performed by: INTERNAL MEDICINE

## 2024-09-10 RX ORDER — ASPIRIN 81 MG/1
81 TABLET ORAL DAILY
COMMUNITY

## 2024-09-10 RX ORDER — IBUPROFEN 200 MG
200 TABLET ORAL EVERY 6 HOURS PRN
COMMUNITY

## 2024-09-10 RX ORDER — FAMOTIDINE 20 MG/1
20 TABLET, FILM COATED ORAL DAILY PRN
COMMUNITY

## 2024-09-10 NOTE — LETTER
September 10, 2024       No Recipients    Patient: Antony Williamson   YOB: 1960   Date of Visit: 9/10/2024     Dear Toby Williamson MD:       Thank you for referring Antony Williamson to me for evaluation. Below are the relevant portions of my assessment and plan of care.    If you have questions, please do not hesitate to call me. I look forward to following Antony along with you.         Sincerely,        Elvis Oseguera MD        CC:   No Recipients    Elvis Oseguera MD  09/10/24 1151  Sign when Signing Visit  Chief Complaint   Patient presents with   • Establish Care     Referred for HTN and has strong family history of heart disease, father  at 43. Last lab work was in in chart under labs from scan on 08/15/24. Saw cardiologist about 30 years ago, had cath no stents. States that he was told on EKG they told him that he has a ST segment depression. There is an EKG from surgery in chart under cardiovascular tab. States that he rarely has palpitations. Has surgery in left shoulder 3 weeks ago, did not take BP in that arm.   • Aspirin     Patient is on aspirin.        CARDIAC COMPLAINTS  Cardiac evaluation      Subjective  Antony Williamson is a 63 y.o. male came in today for his initial cardiac evaluation.  He has history of hypertension, diabetes and hypercholesterolemia.  He has a very strong family history of premature coronary artery disease.  His father was diagnosed with ischemic heart disease when he was around 30 and he  at the age of 42.  He is now referred for cardiac evaluation.  He denies having any chest pain or shortness of breath.  He did undergo a shoulder surgery and was told that he had some EKG changes.  He underwent surgery without any complication.  He apparently had changes like that about 30 years ago.  He underwent a regular stress test which showed some ST changes underwent cardiac catheterization.  Apparently no significant blockage noted and no stents was placed.   He has not been followed by any cardiologist for almost 30 years now.  He also has sleep apnea for which he uses CPAP.  He has diabetes for which he was on Ozempic in the past and now taking Mounjaro.  Prior to the shoulder surgery they told him to stop taking it for 1 month.  He gained almost 20 pounds in the last few weeks.  He does notice fatigue and tiredness.  He does have some occasional palpitation.  His lab work in May showed normal PSA, cholesterol of 105 with the LDL of 36.  His sodium is slightly reduced at 134.  His renal function is normal.  His liver function was normal.  His blood count was normal.  He has no history of smoking or drinking alcohol.  As stated above he does have a strong family history of ischemic heart disease.  Both his father and his paternal grandmother had premature ischemic heart disease    Past Surgical History:   Procedure Laterality Date   • COLONOSCOPY     • EYE SURGERY Bilateral     cataracts   • HERNIA REPAIR      right inguinal as infant   • SHOULDER SURGERY Left 2008    arthroscopy   • SKIN BIOPSY     • TOTAL SHOULDER ARTHROPLASTY Left 8/16/2024    Procedure: REVERSE TOTAL SHOULDER LEFT;  Surgeon: Benjy Lindo MD;  Location: [facility];  Service: Orthopedics;  Laterality: Left;       Current Outpatient Medications   Medication Sig Dispense Refill   • acetaminophen (TYLENOL) 500 MG tablet Take 2 tablets by mouth 3 (Three) Times a Day.     • aspirin 81 MG EC tablet Take 1 tablet by mouth Daily.     • buPROPion XL (WELLBUTRIN XL) 150 MG 24 hr tablet Take 1 tablet by mouth Every Morning.  1   • BYSTOLIC 10 MG tablet Take 2 tablets by mouth Take As Directed. 20 mg morning and 10 mg at night  1   • Cholecalciferol (Vitamin D) 50 MCG (2000 UT) capsule Take 1 capsule by mouth.     • diphenhydrAMINE (BENADRYL) 25 mg capsule Take 1 capsule by mouth Every 6 (Six) Hours As Needed for Itching.     • docusate sodium (COLACE) 100 MG capsule Take 1 capsule by mouth 2 (Two) Times  a Day As Needed.     • dutasteride (AVODART) 0.5 MG capsule Take 1 capsule by mouth Daily.  11   • famotidine (PEPCID) 20 MG tablet Take 1 tablet by mouth Daily As Needed for Heartburn.     • hydrochlorothiazide (HYDRODIURIL) 25 MG tablet Take 1 tablet by mouth Daily.  1   • ibuprofen (ADVIL,MOTRIN) 200 MG tablet Take 1 tablet by mouth Every 6 (Six) Hours As Needed for Mild Pain.     • metFORMIN ER (GLUCOPHAGE-XR) 500 MG 24 hr tablet Take 1 tablet by mouth Daily.  1   • NIFEdipine XL (PROCARDIA XL) 60 MG 24 hr tablet Take 1 tablet by mouth Daily.     • oxyCODONE (ROXICODONE) 5 MG immediate release tablet Take 1 tablet by mouth Every 6 (Six) Hours As Needed for Moderate Pain. 40 tablet 0   • Polyethylene Glycol 3350 (MIRALAX PO) Take  by mouth As Needed.     • rosuvastatin (CRESTOR) 10 MG tablet Take 1 tablet by mouth Every Night.     • telmisartan (MICARDIS) 80 MG tablet Take 1 tablet by mouth Daily.     • Tirzepatide (Mounjaro) 12.5 MG/0.5ML solution pen-injector pen Inject 0.5 mL under the skin into the appropriate area as directed 1 (One) Time Per Week.     • zolpidem (AMBIEN) 5 MG tablet Take 2 tablets by mouth At Night As Needed for Sleep.       No current facility-administered medications for this visit.           ALLERGIES:  Patient has no known allergies.    Past Medical History:   Diagnosis Date   • Arthritis    • BPH (benign prostatic hyperplasia)    • Cancer     non melanoma multiple   • Diabetes    • GERD (gastroesophageal reflux disease)    • High cholesterol    • Hypertension    • Sleep apnea    • Wears hearing aid in both ears    • Wears reading eyeglasses        Social History     Tobacco Use   Smoking Status Never   • Passive exposure: Past   Smokeless Tobacco Never          Family History   Problem Relation Age of Onset   • Cancer Mother    • Heart attack Father    • Heart disease Father    • Heart attack Paternal Grandmother    • Heart disease Paternal Grandmother        Review of Systems  "  Constitutional: Negative for decreased appetite and malaise/fatigue.   HENT:  Negative for congestion and sore throat.    Eyes:  Negative for blurred vision, double vision and visual disturbance.   Cardiovascular:  Positive for palpitations. Negative for chest pain.   Respiratory:  Negative for shortness of breath and snoring.    Endocrine: Negative for cold intolerance and heat intolerance.   Hematologic/Lymphatic: Negative for adenopathy. Does not bruise/bleed easily.   Skin:  Negative for itching, nail changes and skin cancer.   Musculoskeletal:  Negative for arthritis and myalgias.   Gastrointestinal:  Negative for abdominal pain, dysphagia and heartburn.   Genitourinary:  Negative for bladder incontinence and frequency.   Neurological:  Negative for dizziness, seizures and vertigo.   Psychiatric/Behavioral:  Negative for altered mental status.    Allergic/Immunologic: Negative for environmental allergies and hives.     Diabetes- Yes  Thyroid- normal    Objective    /78 (BP Location: Right arm)   Pulse 73   Ht 177.8 cm (70\")   Wt 115 kg (254 lb)   BMI 36.45 kg/m²     Vitals and nursing note reviewed.   Constitutional:       Appearance: Healthy appearance. Not in distress.   Eyes:      Conjunctiva/sclera: Conjunctivae normal.      Pupils: Pupils are equal, round, and reactive to light.   HENT:      Head: Normocephalic.   Pulmonary:      Effort: Pulmonary effort is normal.      Breath sounds: Normal breath sounds.   Cardiovascular:      PMI at left midclavicular line. Normal rate. Regular rhythm.      Murmurs: There is a grade 3/6 high frequency blowing holosystolic murmur at the apex.   Abdominal:      General: Bowel sounds are normal.      Palpations: Abdomen is soft.   Musculoskeletal: Normal range of motion.      Cervical back: Normal range of motion and neck supple. Skin:     General: Skin is warm and dry.   Neurological:      Mental Status: Alert, oriented to person, place, and time and oriented " to person, place and time.       ECG 12 Lead    Date/Time: 9/10/2024 11:51 AM  Performed by: Elvis Oseguera MD    Authorized by: Elvis Oseguera MD  Comparison: compared with previous ECG from 8/16/2024  Comparison to previous ECG: No ST changes  Rhythm: sinus rhythm  Rate: normal  QRS axis: normal  Other findings: T wave abnormality    Clinical impression: non-specific ECG        @ASSESSMENT/PLAN@  Class 2 Severe Obesity (BMI >=35 and <=39.9). Obesity-related health conditions include the following: obstructive sleep apnea, hypertension, diabetes mellitus, and dyslipidemias. Obesity is newly identified. BMI is is above average; BMI management plan is completed. We discussed low calorie, low carb based diet program, portion control, and increasing exercise.     Diagnoses and all orders for this visit:    1. Essential hypertension (Primary)    2. Type 2 diabetes mellitus without complication, without long-term current use of insulin  -     Stress Test With Myocardial Perfusion One Day; Future    3. Hypercholesterolemia  -     Stress Test With Myocardial Perfusion One Day; Future    4. Sleep apnea in adult  -     Adult Transthoracic Echo Complete W/ Cont if Necessary Per Protocol; Future    5. Silent myocardial ischemia  -     Stress Test With Myocardial Perfusion One Day; Future    6. Benign prostatic hyperplasia with urinary obstruction    7. Metabolic syndrome    8. Cardiac murmur  -     Adult Transthoracic Echo Complete W/ Cont if Necessary Per Protocol; Future       At baseline his heart rate is stable.  His blood pressure is normal.  His EKG done today shows normal sinus rhythm.  Tall T waves in the anterior leads.  No significant ST depression.  His clinical examination reveals a BMI of 36.  His cardiovascular examination reveals no carotid bruit, 3/6 systolic murmur at the mitral area.    Regarding his hypertension, it seems to be very well-controlled with a combination of Micardis,  hydrochlorothiazide, Procardia XL and Bystolic.  He does have mild hyponatremia which could be related to combination of Micardis and hydrochlorothiazide.  Need to monitor it.    Regarding his diabetes, he is on metformin and he is starting back his Mounjaro.  Will continue to monitor his blood sugar and A1c level    Regarding his hypercholesterolemia, he is on Crestor and tolerating it well.  His liver function is normal.  His LDL is well-controlled so we will continue to monitor.    Regarding his sleep apnea, he has been using the CPAP.  Talk to him about weight reduction.    Regarding the possibility of silent ischemia was discussed with him.  I scheduled him to undergo a stress test to evaluate his functional status, chronotropic response, blood pressure response and to rule out any stress-induced ischemia    Regarding the cardiac murmur, I scheduled him to undergo an echocardiogram to look for LVH, LV function, valvular structures and the PA pressure    Regarding his BPH, he is taking Avodart but he still continues to have some symptoms    Regarding his metabolic syndrome, talked to him about different diet and gave him papers on DASH diet    Based on the results, further recommendations will be made             Electronically signed by Elvis Oseguera MD September 10, 2024 11:37 EDT

## 2024-09-10 NOTE — PROGRESS NOTES
Chief Complaint   Patient presents with   • Establish Care     Referred for HTN and has strong family history of heart disease, father  at 43. Last lab work was in in chart under labs from scan on 08/15/24. Saw cardiologist about 30 years ago, had cath no stents. States that he was told on EKG they told him that he has a ST segment depression. There is an EKG from surgery in chart under cardiovascular tab. States that he rarely has palpitations. Has surgery in left shoulder 3 weeks ago, did not take BP in that arm.   • Aspirin     Patient is on aspirin.        CARDIAC COMPLAINTS  Cardiac evaluation      Subjective   Anotny Williamson is a 63 y.o. male came in today for his initial cardiac evaluation.  He has history of hypertension, diabetes and hypercholesterolemia.  He has a very strong family history of premature coronary artery disease.  His father was diagnosed with ischemic heart disease when he was around 30 and he  at the age of 42.  He is now referred for cardiac evaluation.  He denies having any chest pain or shortness of breath.  He did undergo a shoulder surgery and was told that he had some EKG changes.  He underwent surgery without any complication.  He apparently had changes like that about 30 years ago.  He underwent a regular stress test which showed some ST changes underwent cardiac catheterization.  Apparently no significant blockage noted and no stents was placed.  He has not been followed by any cardiologist for almost 30 years now.  He also has sleep apnea for which he uses CPAP.  He has diabetes for which he was on Ozempic in the past and now taking Mounjaro.  Prior to the shoulder surgery they told him to stop taking it for 1 month.  He gained almost 20 pounds in the last few weeks.  He does notice fatigue and tiredness.  He does have some occasional palpitation.  His lab work in May showed normal PSA, cholesterol of 105 with the LDL of 36.  His sodium is slightly reduced at 134.  His  renal function is normal.  His liver function was normal.  His blood count was normal.  He has no history of smoking or drinking alcohol.  As stated above he does have a strong family history of ischemic heart disease.  Both his father and his paternal grandmother had premature ischemic heart disease    Past Surgical History:   Procedure Laterality Date   • COLONOSCOPY     • EYE SURGERY Bilateral     cataracts   • HERNIA REPAIR      right inguinal as infant   • SHOULDER SURGERY Left 2008    arthroscopy   • SKIN BIOPSY     • TOTAL SHOULDER ARTHROPLASTY Left 8/16/2024    Procedure: REVERSE TOTAL SHOULDER LEFT;  Surgeon: Benjy Lindo MD;  Location: [facility];  Service: Orthopedics;  Laterality: Left;       Current Outpatient Medications   Medication Sig Dispense Refill   • acetaminophen (TYLENOL) 500 MG tablet Take 2 tablets by mouth 3 (Three) Times a Day.     • aspirin 81 MG EC tablet Take 1 tablet by mouth Daily.     • buPROPion XL (WELLBUTRIN XL) 150 MG 24 hr tablet Take 1 tablet by mouth Every Morning.  1   • BYSTOLIC 10 MG tablet Take 2 tablets by mouth Take As Directed. 20 mg morning and 10 mg at night  1   • Cholecalciferol (Vitamin D) 50 MCG (2000 UT) capsule Take 1 capsule by mouth.     • diphenhydrAMINE (BENADRYL) 25 mg capsule Take 1 capsule by mouth Every 6 (Six) Hours As Needed for Itching.     • docusate sodium (COLACE) 100 MG capsule Take 1 capsule by mouth 2 (Two) Times a Day As Needed.     • dutasteride (AVODART) 0.5 MG capsule Take 1 capsule by mouth Daily.  11   • famotidine (PEPCID) 20 MG tablet Take 1 tablet by mouth Daily As Needed for Heartburn.     • hydrochlorothiazide (HYDRODIURIL) 25 MG tablet Take 1 tablet by mouth Daily.  1   • ibuprofen (ADVIL,MOTRIN) 200 MG tablet Take 1 tablet by mouth Every 6 (Six) Hours As Needed for Mild Pain.     • metFORMIN ER (GLUCOPHAGE-XR) 500 MG 24 hr tablet Take 1 tablet by mouth Daily.  1   • NIFEdipine XL (PROCARDIA XL) 60 MG 24 hr tablet Take 1  tablet by mouth Daily.     • oxyCODONE (ROXICODONE) 5 MG immediate release tablet Take 1 tablet by mouth Every 6 (Six) Hours As Needed for Moderate Pain. 40 tablet 0   • Polyethylene Glycol 3350 (MIRALAX PO) Take  by mouth As Needed.     • rosuvastatin (CRESTOR) 10 MG tablet Take 1 tablet by mouth Every Night.     • telmisartan (MICARDIS) 80 MG tablet Take 1 tablet by mouth Daily.     • Tirzepatide (Mounjaro) 12.5 MG/0.5ML solution pen-injector pen Inject 0.5 mL under the skin into the appropriate area as directed 1 (One) Time Per Week.     • zolpidem (AMBIEN) 5 MG tablet Take 2 tablets by mouth At Night As Needed for Sleep.       No current facility-administered medications for this visit.           ALLERGIES:  Patient has no known allergies.    Past Medical History:   Diagnosis Date   • Arthritis    • BPH (benign prostatic hyperplasia)    • Cancer     non melanoma multiple   • Diabetes    • GERD (gastroesophageal reflux disease)    • High cholesterol    • Hypertension    • Sleep apnea    • Wears hearing aid in both ears    • Wears reading eyeglasses        Social History     Tobacco Use   Smoking Status Never   • Passive exposure: Past   Smokeless Tobacco Never          Family History   Problem Relation Age of Onset   • Cancer Mother    • Heart attack Father    • Heart disease Father    • Heart attack Paternal Grandmother    • Heart disease Paternal Grandmother        Review of Systems   Constitutional: Negative for decreased appetite and malaise/fatigue.   HENT:  Negative for congestion and sore throat.    Eyes:  Negative for blurred vision, double vision and visual disturbance.   Cardiovascular:  Positive for palpitations. Negative for chest pain.   Respiratory:  Negative for shortness of breath and snoring.    Endocrine: Negative for cold intolerance and heat intolerance.   Hematologic/Lymphatic: Negative for adenopathy. Does not bruise/bleed easily.   Skin:  Negative for itching, nail changes and skin cancer.  "  Musculoskeletal:  Negative for arthritis and myalgias.   Gastrointestinal:  Negative for abdominal pain, dysphagia and heartburn.   Genitourinary:  Negative for bladder incontinence and frequency.   Neurological:  Negative for dizziness, seizures and vertigo.   Psychiatric/Behavioral:  Negative for altered mental status.    Allergic/Immunologic: Negative for environmental allergies and hives.     Diabetes- Yes  Thyroid- normal    Objective     /78 (BP Location: Right arm)   Pulse 73   Ht 177.8 cm (70\")   Wt 115 kg (254 lb)   BMI 36.45 kg/m²     Vitals and nursing note reviewed.   Constitutional:       Appearance: Healthy appearance. Not in distress.   Eyes:      Conjunctiva/sclera: Conjunctivae normal.      Pupils: Pupils are equal, round, and reactive to light.   HENT:      Head: Normocephalic.   Pulmonary:      Effort: Pulmonary effort is normal.      Breath sounds: Normal breath sounds.   Cardiovascular:      PMI at left midclavicular line. Normal rate. Regular rhythm.      Murmurs: There is a grade 3/6 high frequency blowing holosystolic murmur at the apex.   Abdominal:      General: Bowel sounds are normal.      Palpations: Abdomen is soft.   Musculoskeletal: Normal range of motion.      Cervical back: Normal range of motion and neck supple. Skin:     General: Skin is warm and dry.   Neurological:      Mental Status: Alert, oriented to person, place, and time and oriented to person, place and time.       ECG 12 Lead    Date/Time: 9/10/2024 11:51 AM  Performed by: Elvis Oseguera MD    Authorized by: Elvis Oseguera MD  Comparison: compared with previous ECG from 8/16/2024  Comparison to previous ECG: No ST changes  Rhythm: sinus rhythm  Rate: normal  QRS axis: normal  Other findings: T wave abnormality    Clinical impression: non-specific ECG        @ASSESSMENT/PLAN@  Class 2 Severe Obesity (BMI >=35 and <=39.9). Obesity-related health conditions include the following: obstructive sleep " apnea, hypertension, diabetes mellitus, and dyslipidemias. Obesity is newly identified. BMI is is above average; BMI management plan is completed. We discussed low calorie, low carb based diet program, portion control, and increasing exercise.     Diagnoses and all orders for this visit:    1. Essential hypertension (Primary)    2. Type 2 diabetes mellitus without complication, without long-term current use of insulin  -     Stress Test With Myocardial Perfusion One Day; Future    3. Hypercholesterolemia  -     Stress Test With Myocardial Perfusion One Day; Future    4. Sleep apnea in adult  -     Adult Transthoracic Echo Complete W/ Cont if Necessary Per Protocol; Future    5. Silent myocardial ischemia  -     Stress Test With Myocardial Perfusion One Day; Future    6. Benign prostatic hyperplasia with urinary obstruction    7. Metabolic syndrome    8. Cardiac murmur  -     Adult Transthoracic Echo Complete W/ Cont if Necessary Per Protocol; Future       At baseline his heart rate is stable.  His blood pressure is normal.  His EKG done today shows normal sinus rhythm.  Tall T waves in the anterior leads.  No significant ST depression.  His clinical examination reveals a BMI of 36.  His cardiovascular examination reveals no carotid bruit, 3/6 systolic murmur at the mitral area.    Regarding his hypertension, it seems to be very well-controlled with a combination of Micardis, hydrochlorothiazide, Procardia XL and Bystolic.  He does have mild hyponatremia which could be related to combination of Micardis and hydrochlorothiazide.  Need to monitor it.    Regarding his diabetes, he is on metformin and he is starting back his Mounjaro.  Will continue to monitor his blood sugar and A1c level    Regarding his hypercholesterolemia, he is on Crestor and tolerating it well.  His liver function is normal.  His LDL is well-controlled so we will continue to monitor.    Regarding his sleep apnea, he has been using the CPAP.  Talk  to him about weight reduction.    Regarding the possibility of silent ischemia was discussed with him.  I scheduled him to undergo a stress test to evaluate his functional status, chronotropic response, blood pressure response and to rule out any stress-induced ischemia    Regarding the cardiac murmur, I scheduled him to undergo an echocardiogram to look for LVH, LV function, valvular structures and the PA pressure    Regarding his BPH, he is taking Avodart but he still continues to have some symptoms    Regarding his metabolic syndrome, talked to him about different diet and gave him papers on DASH diet    Based on the results, further recommendations will be made             Electronically signed by Elvis Oseguera MD September 10, 2024 11:37 EDT

## 2024-10-16 ENCOUNTER — TELEPHONE (OUTPATIENT)
Dept: CARDIOLOGY | Facility: CLINIC | Age: 64
End: 2024-10-16
Payer: COMMERCIAL

## 2024-10-16 NOTE — TELEPHONE ENCOUNTER
Hub staff attempted to follow warm transfer process and was unsuccessful     Caller: Antony Williamson    Relationship to patient: Self    Best call back number: [phone]     Patient is needing:   APPT TOMORROW  FOR TESTING, HAS SHEET FROM OFFICE BUT HE CAN NOT FIND THIS. PT IS REQUESTING SOMEONE CALL HIM BACK AND GO OVER TESTING DIRECTIONS. UNABLE TO WT TO TESTING LOCATION.

## 2024-10-17 ENCOUNTER — HOSPITAL ENCOUNTER (OUTPATIENT)
Dept: CARDIOLOGY | Facility: HOSPITAL | Age: 64
Discharge: HOME | End: 2024-10-17
Payer: COMMERCIAL

## 2024-10-17 VITALS — HEIGHT: 70 IN | BODY MASS INDEX: 36.3 KG/M2 | WEIGHT: 253.53 LBS

## 2024-10-17 DIAGNOSIS — I25.6 SILENT MYOCARDIAL ISCHEMIA: ICD-10-CM

## 2024-10-17 DIAGNOSIS — G47.30 SLEEP APNEA IN ADULT: ICD-10-CM

## 2024-10-17 DIAGNOSIS — R01.1 CARDIAC MURMUR: ICD-10-CM

## 2024-10-17 DIAGNOSIS — E78.00 HYPERCHOLESTEROLEMIA: ICD-10-CM

## 2024-10-17 DIAGNOSIS — E11.9 TYPE 2 DIABETES MELLITUS WITHOUT COMPLICATION, WITHOUT LONG-TERM CURRENT USE OF INSULIN: ICD-10-CM

## 2024-10-17 LAB
AORTIC DIMENSIONLESS INDEX: 0.86 (DI)
BH CV ECHO MEAS - ACS: 2.22 CM
BH CV ECHO MEAS - AO MAX PG: 7.7 MMHG
BH CV ECHO MEAS - AO MEAN PG: 4.4 MMHG
BH CV ECHO MEAS - AO ROOT DIAM: 4 CM
BH CV ECHO MEAS - AO V2 MAX: 139.1 CM/SEC
BH CV ECHO MEAS - AO V2 VTI: 31.3 CM
BH CV ECHO MEAS - EDV(CUBED): 104 ML
BH CV ECHO MEAS - EF_3D-VOL: 59 %
BH CV ECHO MEAS - ESV(CUBED): 26.6 ML
BH CV ECHO MEAS - FS: 36.5 %
BH CV ECHO MEAS - IVS/LVPW: 1.12 CM
BH CV ECHO MEAS - IVSD: 1.25 CM
BH CV ECHO MEAS - LA DIMENSION: 4.2 CM
BH CV ECHO MEAS - LAT PEAK E' VEL: 8 CM/SEC
BH CV ECHO MEAS - LV MASS(C)D: 208.7 GRAMS
BH CV ECHO MEAS - LV MAX PG: 5.7 MMHG
BH CV ECHO MEAS - LV MEAN PG: 2.46 MMHG
BH CV ECHO MEAS - LV V1 MAX: 119.8 CM/SEC
BH CV ECHO MEAS - LV V1 VTI: 27 CM
BH CV ECHO MEAS - LVIDD: 4.7 CM
BH CV ECHO MEAS - LVIDS: 3 CM
BH CV ECHO MEAS - LVPWD: 1.12 CM
BH CV ECHO MEAS - MED PEAK E' VEL: 6.2 CM/SEC
BH CV ECHO MEAS - MV A MAX VEL: 72.3 CM/SEC
BH CV ECHO MEAS - MV DEC SLOPE: 447.3 CM/SEC2
BH CV ECHO MEAS - MV DEC TIME: 0.2 SEC
BH CV ECHO MEAS - MV E MAX VEL: 82.3 CM/SEC
BH CV ECHO MEAS - MV E/A: 1.14
BH CV ECHO MEAS - MV MAX PG: 4.3 MMHG
BH CV ECHO MEAS - MV MEAN PG: 2.18 MMHG
BH CV ECHO MEAS - MV P1/2T: 64.5 MSEC
BH CV ECHO MEAS - MV V2 VTI: 34.2 CM
BH CV ECHO MEAS - MVA(P1/2T): 3.4 CM2
BH CV ECHO MEAS - PA V2 MAX: 108.2 CM/SEC
BH CV ECHO MEAS - RAP SYSTOLE: 8 MMHG
BH CV ECHO MEAS - RV MAX PG: 2.46 MMHG
BH CV ECHO MEAS - RV V1 MAX: 78.4 CM/SEC
BH CV ECHO MEAS - RV V1 VTI: 17.4 CM
BH CV ECHO MEAS - RVDD: 3.4 CM
BH CV ECHO MEAS - RVSP: 29.9 MMHG
BH CV ECHO MEAS - TAPSE (>1.6): 2.9 CM
BH CV ECHO MEAS - TR MAX PG: 21.9 MMHG
BH CV ECHO MEAS - TR MAX VEL: 233.8 CM/SEC
BH CV ECHO MEASUREMENTS AVERAGE E/E' RATIO: 11.59
BH CV REST NUCLEAR ISOTOPE DOSE: 10 MCI
BH CV STRESS DURATION MIN STAGE 1: 3
BH CV STRESS DURATION SEC STAGE 1: 0
BH CV STRESS GRADE STAGE 1: 0
BH CV STRESS METS STAGE 1: 2.3
BH CV STRESS NUCLEAR ISOTOPE DOSE: 30 MCI
BH CV STRESS PROTOCOL 1: NORMAL
BH CV STRESS RECOVERY BP: NORMAL MMHG
BH CV STRESS RECOVERY HR: 93 BPM
BH CV STRESS SPEED STAGE 1: 1.7
BH CV STRESS STAGE 1: 1
BH CV XLRA - TDI S': 12.1 CM/SEC
LV EF NUC BP: 62 %
MAXIMAL PREDICTED HEART RATE: 156 BPM
PERCENT MAX PREDICTED HR: 77.56 %
SINUS: 3.5 CM
STRESS BASELINE BP: NORMAL MMHG
STRESS BASELINE HR: 75 BPM
STRESS PERCENT HR: 91 %
STRESS POST ESTIMATED WORKLOAD: 10.1 METS
STRESS POST EXERCISE DUR MIN: 13 MIN
STRESS POST EXERCISE DUR SEC: 55 SEC
STRESS POST PEAK BP: NORMAL MMHG
STRESS POST PEAK HR: 121 BPM
STRESS TARGET HR: 133 BPM

## 2024-10-17 PROCEDURE — 78452 HT MUSCLE IMAGE SPECT MULT: CPT

## 2024-10-17 PROCEDURE — 93017 CV STRESS TEST TRACING ONLY: CPT

## 2024-10-17 PROCEDURE — 0 TECHNETIUM SESTAMIBI: Performed by: INTERNAL MEDICINE

## 2024-10-17 PROCEDURE — A9500 TC99M SESTAMIBI: HCPCS | Performed by: INTERNAL MEDICINE

## 2024-10-17 PROCEDURE — 93306 TTE W/DOPPLER COMPLETE: CPT

## 2024-10-17 RX ADMIN — TECHNETIUM TC 99M SESTAMIBI 1 DOSE: 1 INJECTION INTRAVENOUS at 10:07

## 2024-10-17 RX ADMIN — TECHNETIUM TC 99M SESTAMIBI 1 DOSE: 1 INJECTION INTRAVENOUS at 08:09

## 2024-11-21 ENCOUNTER — TELEPHONE (OUTPATIENT)
Dept: CARDIOLOGY | Facility: CLINIC | Age: 64
End: 2024-11-21
Payer: COMMERCIAL

## 2024-11-21 NOTE — TELEPHONE ENCOUNTER
I called patient and went over recommendations that if he is having significant chest pain he needs a cardiac cath. Pt denies any chest pain at all. Reports doing well, some mild SOB but nothing significant. Advised him that I will call him with a sooner appointment in the next few weeks but if he develops any symptoms to call to proceed with the cath. Understanding voiced.

## 2024-11-21 NOTE — TELEPHONE ENCOUNTER
If he is having chest pain, need cardiac catheterization.  If no significant chest pain, we will try to see him again in the next few weeks

## 2024-11-21 NOTE — TELEPHONE ENCOUNTER
Caller: Antony Williamson    Relationship to patient: Self    Best call back number: [phone]    Chief complaint: PT RECENTLY HAD A CALCIUM TEST DONE AND HIS SCORE . DR. WILLIAMSON'S OFFICE IS REQUESTING HER BE SEEN FOR THIS.     Type of visit: FOLLOW UP    Requested date: ASAP     If rescheduling, when is the original appointment: 03.20.25     Additional notes: PT IS GOING TO HAVE DR. WILLIAMSON'S OFFICE FAX OVER HIS LAB RESULTS ON THIS.

## 2024-11-21 NOTE — TELEPHONE ENCOUNTER
CT coronary calcium score results, lab results and last office note in chart for you to review and give recommendations.

## 2024-11-26 NOTE — TELEPHONE ENCOUNTER
Caller: Antony Williamson    Relationship: Self    Best call back number: [phone]     What is the best time to reach you: ANY     What was the call regarding: PT SPOKE WITH NURSE ON FRIDAY, WAS TOLD HE WOULD BE CALLED TO GET A SOONER APPT WITHIN THE NEXT COUPLE OF WEEKS. PT IS CHECKING ON THE STATUS OF THIS. PLEASE ADVISE.      Is it okay if the provider responds through MyChart: CALL

## 2024-12-17 ENCOUNTER — OFFICE VISIT (OUTPATIENT)
Dept: CARDIOLOGY | Facility: CLINIC | Age: 64
End: 2024-12-17
Payer: COMMERCIAL

## 2024-12-17 VITALS
HEART RATE: 76 BPM | DIASTOLIC BLOOD PRESSURE: 86 MMHG | HEIGHT: 70 IN | WEIGHT: 230 LBS | BODY MASS INDEX: 32.93 KG/M2 | SYSTOLIC BLOOD PRESSURE: 150 MMHG

## 2024-12-17 DIAGNOSIS — I25.10 CORONARY ARTERY CALCIFICATION SEEN ON CAT SCAN: Primary | ICD-10-CM

## 2024-12-17 DIAGNOSIS — E78.00 HYPERCHOLESTEROLEMIA: ICD-10-CM

## 2024-12-17 DIAGNOSIS — E88.810 METABOLIC SYNDROME: ICD-10-CM

## 2024-12-17 DIAGNOSIS — G47.30 SLEEP APNEA IN ADULT: ICD-10-CM

## 2024-12-17 DIAGNOSIS — I10 ESSENTIAL HYPERTENSION: ICD-10-CM

## 2024-12-17 DIAGNOSIS — E11.9 TYPE 2 DIABETES MELLITUS WITHOUT COMPLICATION, WITHOUT LONG-TERM CURRENT USE OF INSULIN: ICD-10-CM

## 2024-12-17 RX ORDER — CLOPIDOGREL BISULFATE 75 MG/1
75 TABLET ORAL DAILY
Qty: 90 TABLET | Refills: 3 | Status: SHIPPED | OUTPATIENT
Start: 2024-12-17

## 2024-12-17 RX ORDER — ROSUVASTATIN CALCIUM 20 MG/1
20 TABLET, COATED ORAL DAILY
COMMUNITY

## 2024-12-17 RX ORDER — MELOXICAM 15 MG/1
15 TABLET ORAL DAILY
COMMUNITY

## 2024-12-17 NOTE — PROGRESS NOTES
Chief Complaint   Patient presents with   • Follow-up     For cardiac management, recent coronary calcium score elevated at 427, report in chart. Pt denies any chest pain or SOB.    • Weight Loss     down 23 pounds from last visit   • Labs     Most recent labs from 11/18 in chart. Pt was on Crestor 10 mg daily, PCP increased to 20 mg daily after  the Coronary calcium score came back. Pt said his daughter is wondering if he should be on 40 mg daily.        CARDIAC COMPLAINTS  Cardiac evaluation        Subjective   Antony Williamson is a 64 y.o. male came in today for reevaluation.  He was seen here in September for cardiac management.  He has history of hypertension, diabetes, hypercholesterolemia who also has a very strong family history of premature coronary artery disease.  He has history of normal coronaries by cardiac catheterization many years ago.  He underwent an echocardiogram and stress test.  The echocardiogram shows normal LV function left atrial enlargement possible bicuspid valve and mildly dilated aortic root.  He is stress test which was done with a modified Suhas protocol showed average exercise tolerance normal LV function and changes at the apex which seems to be more of a thinning it was decided to manage medically since he was not having any symptoms.  He apparently underwent calcium score.  The overall score was 427 and it was noted in LAD and circumflex arteries.   He denies having any new symptoms.  He has lost about 23 pounds with Mounjaro and feels better.  He is able to climb the steps without any problem.  He did move a lot of good without any increased sweating or shortness of breath.  He had some labs done last month and his LDL was 56 HDL was 30.  His dose of Crestor was increased to 20 mg.  Apparently his daughter works in a pharmacy wants to know whether it can go up to 40.  His blood count was normal.              Cardiac History  Past Surgical History:   Procedure Laterality Date   •  CARDIOVASCULAR STRESS TEST  10/17/2024    (Mod) 13.55. 10.1 METS. 77% THR.150/71. EF 62%. Apical Infarct Vs Thinning   • COLONOSCOPY     • ECHO - CONVERTED  10/17/2024    EF 60%. LA- 4.2. R/O Bicuspid AV. Trace MRRaven AO- 4.0. RVSP- 30 mmHg   • EYE SURGERY Bilateral     cataracts   • HERNIA REPAIR      right inguinal as infant   • SHOULDER SURGERY Left 2008    arthroscopy   • SKIN BIOPSY     • TOTAL SHOULDER ARTHROPLASTY Left 08/16/2024    Procedure: REVERSE TOTAL SHOULDER LEFT;  Surgeon: Benjy Lindo MD;  Location: [facility];  Service: Orthopedics;  Laterality: Left;       Current Outpatient Medications   Medication Sig Dispense Refill   • acetaminophen (TYLENOL) 500 MG tablet Take 2 tablets by mouth 3 (Three) Times a Day.     • aspirin 81 MG EC tablet Take 1 tablet by mouth Daily.     • buPROPion XL (WELLBUTRIN XL) 150 MG 24 hr tablet Take 1 tablet by mouth Every Morning.  1   • BYSTOLIC 10 MG tablet Take  by mouth Take As Directed. 20 mg morning and 10 mg at night  1   • Cholecalciferol (Vitamin D) 50 MCG (2000 UT) capsule Take 1 capsule by mouth.     • diphenhydrAMINE (BENADRYL) 25 mg capsule Take 1 capsule by mouth Every 6 (Six) Hours As Needed for Itching.     • docusate sodium (COLACE) 100 MG capsule Take 1 capsule by mouth 2 (Two) Times a Day As Needed.     • dutasteride (AVODART) 0.5 MG capsule Take 1 capsule by mouth Daily.  11   • famotidine (PEPCID) 20 MG tablet Take 1 tablet by mouth Daily As Needed for Heartburn.     • hydrochlorothiazide (HYDRODIURIL) 25 MG tablet Take 1 tablet by mouth Daily.  1   • meloxicam (MOBIC) 15 MG tablet Take 1 tablet by mouth Daily.     • NIFEdipine XL (PROCARDIA XL) 60 MG 24 hr tablet Take 1 tablet by mouth Daily.     • Polyethylene Glycol 3350 (MIRALAX PO) Take  by mouth As Needed.     • rosuvastatin (CRESTOR) 20 MG tablet Take 1 tablet by mouth Daily.     • telmisartan (MICARDIS) 80 MG tablet Take 1 tablet by mouth Daily.     • Tirzepatide (Mounjaro) 12.5 MG/0.5ML  solution pen-injector pen Inject 0.5 mL under the skin into the appropriate area as directed 1 (One) Time Per Week.     • zolpidem (AMBIEN) 5 MG tablet Take 2 tablets by mouth At Night As Needed for Sleep.     • clopidogrel (PLAVIX) 75 MG tablet Take 1 tablet by mouth Daily. 90 tablet 3   • empagliflozin (Jardiance) 25 MG tablet tablet Take 1 tablet by mouth Daily. 90 tablet 3     No current facility-administered medications for this visit.       Allergies  :  Patient has no known allergies.       Past Medical History:   Diagnosis Date   • Arthritis    • BPH (benign prostatic hyperplasia)    • Cancer     non melanoma multiple   • Diabetes    • GERD (gastroesophageal reflux disease)    • High cholesterol    • Hypertension    • Sleep apnea    • Wears hearing aid in both ears    • Wears reading eyeglasses        Social History     Socioeconomic History   • Marital status:    Tobacco Use   • Smoking status: Never     Passive exposure: Past   • Smokeless tobacco: Never   Vaping Use   • Vaping status: Never Used   Substance and Sexual Activity   • Alcohol use: No   • Drug use: No   • Sexual activity: Defer       Family History   Problem Relation Age of Onset   • Cancer Mother    • Heart attack Father    • Heart disease Father    • Heart attack Paternal Grandmother    • Heart disease Paternal Grandmother        Review of Systems   Constitutional: Negative for decreased appetite and malaise/fatigue.   HENT:  Negative for congestion and sore throat.    Eyes:  Negative for blurred vision, double vision and visual disturbance.   Cardiovascular:  Negative for chest pain and palpitations.   Respiratory:  Negative for shortness of breath and snoring.    Endocrine: Negative for cold intolerance and heat intolerance.   Hematologic/Lymphatic: Negative for adenopathy. Does not bruise/bleed easily.   Skin:  Negative for itching, nail changes and skin cancer.   Musculoskeletal:  Negative for arthritis and myalgias.  "  Gastrointestinal:  Negative for abdominal pain, dysphagia and heartburn.   Genitourinary:  Negative for bladder incontinence and frequency.   Neurological:  Negative for dizziness, seizures and vertigo.   Psychiatric/Behavioral:  Negative for altered mental status.    Allergic/Immunologic: Negative for environmental allergies and hives.     Diabetes- Yes  Thyroid- normal    Objective     /86   Pulse 76   Ht 177.8 cm (70\")   Wt 104 kg (230 lb)   BMI 33.00 kg/m²     Vitals and nursing note reviewed.   Constitutional:       Appearance: Healthy appearance. Not in distress.   Eyes:      Conjunctiva/sclera: Conjunctivae normal.      Pupils: Pupils are equal, round, and reactive to light.   HENT:      Head: Normocephalic.   Pulmonary:      Effort: Pulmonary effort is normal.      Breath sounds: Normal breath sounds.   Cardiovascular:      PMI at left midclavicular line. Normal rate. Regular rhythm.   Abdominal:      General: Bowel sounds are normal.      Palpations: Abdomen is soft.   Musculoskeletal: Normal range of motion.      Cervical back: Normal range of motion and neck supple. Skin:     General: Skin is warm and dry.   Neurological:      Mental Status: Alert, oriented to person, place, and time and oriented to person, place and time.   Procedures            @ASSESSMENT/PLAN@        Diagnoses and all orders for this visit:    1. Coronary artery calcification seen on CAT scan (Primary)  -     empagliflozin (Jardiance) 25 MG tablet tablet; Take 1 tablet by mouth Daily.  Dispense: 90 tablet; Refill: 3  -     clopidogrel (PLAVIX) 75 MG tablet; Take 1 tablet by mouth Daily.  Dispense: 90 tablet; Refill: 3    2. Essential hypertension  -     Comprehensive Metabolic Panel; Future    3. Hypercholesterolemia  -     Lipid Panel; Future    4. Type 2 diabetes mellitus without complication, without long-term current use of insulin  -     empagliflozin (Jardiance) 25 MG tablet tablet; Take 1 tablet by mouth Daily.  " Dispense: 90 tablet; Refill: 3  -     Hemoglobin A1c; Future    5. Metabolic syndrome  -     CBC & Differential; Future    6. Sleep apnea in adult       At baseline his heart rate is stable.  His blood pressure is slightly elevated.  He also is little nervous today.  His BMI did come down to 33.  His cardiovascular examination is otherwise unremarkable    Regarding the coronary calcification, I had a long talk with him about the calcification whether it is more in the endothelium or in the epithelium.  As long he is not asymptomatic and the stress test was negative I think we can manage him medically.  I did explain to him the only way to know about the CAD is to do a cardiac catheterization.  At this time he like to wait regarding that.  Meanwhile I advised him to change metformin to Jardiance because of the cardiac problem and I also added Plavix 75 mg once a day    Regarding his blood pressure which is still elevated, he is on Procardia XL, Bystolic, Microzide and hydrochlorothiazide.  He is advised to check his blood pressure regularly at home and if it is still elevated then we will add Aldactone or increase Procardia to 90 mg    Regarding his hypercholesterolemia, he is on moderate dose of Crestor.  I did explain to him that his LDL is well-controlled reducing it more will not be helpful.  Will check his lipid profile again    Regarding diabetes, will check his A1c level after changing metformin to Jardiance    Regarding metabolic syndrome, he has lost some weight.  Encouraged him to continue to do so    Regarding sleep apnea, he does use CPAP regularly    I will see him back in 3 months and if there is any change in his symptoms and he will undergo elective cardiac catheterization.                  Electronically signed by Elvis Oseguera MD December 17, 2024 14:50 EST

## 2024-12-17 NOTE — LETTER
December 17, 2024     Toby Williamson MD  [address] [facility] [place] [address]    Patient: Antony Williamson   YOB: 1960   Date of Visit: 12/17/2024     Dear Toby Williamson MD:       Thank you for referring Anotny Williamson to me for evaluation. Below are the relevant portions of my assessment and plan of care.    If you have questions, please do not hesitate to call me. I look forward to following Antony along with you.         Sincerely,        Elvis Oseguera MD        CC: No Recipients    Elvis Oseguera MD  12/17/24 1501  Sign when Signing Visit  Chief Complaint   Patient presents with   • Follow-up     For cardiac management, recent coronary calcium score elevated at 427, report in chart. Pt denies any chest pain or SOB.    • Weight Loss     down 23 pounds from last visit   • Labs     Most recent labs from 11/18 in chart. Pt was on Crestor 10 mg daily, PCP increased to 20 mg daily after  the Coronary calcium score came back. Pt said his daughter is wondering if he should be on 40 mg daily.        CARDIAC COMPLAINTS  Cardiac evaluation        Subjective  Antony Williamson is a 64 y.o. male came in today for reevaluation.  He was seen here in September for cardiac management.  He has history of hypertension, diabetes, hypercholesterolemia who also has a very strong family history of premature coronary artery disease.  He has history of normal coronaries by cardiac catheterization many years ago.  He underwent an echocardiogram and stress test.  The echocardiogram shows normal LV function left atrial enlargement possible bicuspid valve and mildly dilated aortic root.  He is stress test which was done with a modified Suhas protocol showed average exercise tolerance normal LV function and changes at the apex which seems to be more of a thinning it was decided to manage medically since he was not having any symptoms.  He apparently underwent calcium score.  The overall score was 427 and it was  noted in LAD and circumflex arteries.   He denies having any new symptoms.  He has lost about 23 pounds with Mounjaro and feels better.  He is able to climb the steps without any problem.  He did move a lot of good without any increased sweating or shortness of breath.  He had some labs done last month and his LDL was 56 HDL was 30.  His dose of Crestor was increased to 20 mg.  Apparently his daughter works in a pharmacy wants to know whether it can go up to 40.  His blood count was normal.              Cardiac History  Past Surgical History:   Procedure Laterality Date   • CARDIOVASCULAR STRESS TEST  10/17/2024    (Mod) 13.55. 10.1 METS. 77% THR.150/71. EF 62%. Apical Infarct Vs Thinning   • COLONOSCOPY     • ECHO - CONVERTED  10/17/2024    EF 60%. LA- 4.2. R/O Bicuspid AV. Trace MR. AO- 4.0. RVSP- 30 mmHg   • EYE SURGERY Bilateral     cataracts   • HERNIA REPAIR      right inguinal as infant   • SHOULDER SURGERY Left 2008    arthroscopy   • SKIN BIOPSY     • TOTAL SHOULDER ARTHROPLASTY Left 08/16/2024    Procedure: REVERSE TOTAL SHOULDER LEFT;  Surgeon: Benjy Lindo MD;  Location: [facility];  Service: Orthopedics;  Laterality: Left;       Current Outpatient Medications   Medication Sig Dispense Refill   • acetaminophen (TYLENOL) 500 MG tablet Take 2 tablets by mouth 3 (Three) Times a Day.     • aspirin 81 MG EC tablet Take 1 tablet by mouth Daily.     • buPROPion XL (WELLBUTRIN XL) 150 MG 24 hr tablet Take 1 tablet by mouth Every Morning.  1   • BYSTOLIC 10 MG tablet Take  by mouth Take As Directed. 20 mg morning and 10 mg at night  1   • Cholecalciferol (Vitamin D) 50 MCG (2000 UT) capsule Take 1 capsule by mouth.     • diphenhydrAMINE (BENADRYL) 25 mg capsule Take 1 capsule by mouth Every 6 (Six) Hours As Needed for Itching.     • docusate sodium (COLACE) 100 MG capsule Take 1 capsule by mouth 2 (Two) Times a Day As Needed.     • dutasteride (AVODART) 0.5 MG capsule Take 1 capsule by mouth Daily.  11    • famotidine (PEPCID) 20 MG tablet Take 1 tablet by mouth Daily As Needed for Heartburn.     • hydrochlorothiazide (HYDRODIURIL) 25 MG tablet Take 1 tablet by mouth Daily.  1   • meloxicam (MOBIC) 15 MG tablet Take 1 tablet by mouth Daily.     • NIFEdipine XL (PROCARDIA XL) 60 MG 24 hr tablet Take 1 tablet by mouth Daily.     • Polyethylene Glycol 3350 (MIRALAX PO) Take  by mouth As Needed.     • rosuvastatin (CRESTOR) 20 MG tablet Take 1 tablet by mouth Daily.     • telmisartan (MICARDIS) 80 MG tablet Take 1 tablet by mouth Daily.     • Tirzepatide (Mounjaro) 12.5 MG/0.5ML solution pen-injector pen Inject 0.5 mL under the skin into the appropriate area as directed 1 (One) Time Per Week.     • zolpidem (AMBIEN) 5 MG tablet Take 2 tablets by mouth At Night As Needed for Sleep.     • clopidogrel (PLAVIX) 75 MG tablet Take 1 tablet by mouth Daily. 90 tablet 3   • empagliflozin (Jardiance) 25 MG tablet tablet Take 1 tablet by mouth Daily. 90 tablet 3     No current facility-administered medications for this visit.       Allergies  :  Patient has no known allergies.       Past Medical History:   Diagnosis Date   • Arthritis    • BPH (benign prostatic hyperplasia)    • Cancer     non melanoma multiple   • Diabetes    • GERD (gastroesophageal reflux disease)    • High cholesterol    • Hypertension    • Sleep apnea    • Wears hearing aid in both ears    • Wears reading eyeglasses        Social History     Socioeconomic History   • Marital status:    Tobacco Use   • Smoking status: Never     Passive exposure: Past   • Smokeless tobacco: Never   Vaping Use   • Vaping status: Never Used   Substance and Sexual Activity   • Alcohol use: No   • Drug use: No   • Sexual activity: Defer       Family History   Problem Relation Age of Onset   • Cancer Mother    • Heart attack Father    • Heart disease Father    • Heart attack Paternal Grandmother    • Heart disease Paternal Grandmother        Review of Systems  "  Constitutional: Negative for decreased appetite and malaise/fatigue.   HENT:  Negative for congestion and sore throat.    Eyes:  Negative for blurred vision, double vision and visual disturbance.   Cardiovascular:  Negative for chest pain and palpitations.   Respiratory:  Negative for shortness of breath and snoring.    Endocrine: Negative for cold intolerance and heat intolerance.   Hematologic/Lymphatic: Negative for adenopathy. Does not bruise/bleed easily.   Skin:  Negative for itching, nail changes and skin cancer.   Musculoskeletal:  Negative for arthritis and myalgias.   Gastrointestinal:  Negative for abdominal pain, dysphagia and heartburn.   Genitourinary:  Negative for bladder incontinence and frequency.   Neurological:  Negative for dizziness, seizures and vertigo.   Psychiatric/Behavioral:  Negative for altered mental status.    Allergic/Immunologic: Negative for environmental allergies and hives.     Diabetes- Yes  Thyroid- normal    Objective    /86   Pulse 76   Ht 177.8 cm (70\")   Wt 104 kg (230 lb)   BMI 33.00 kg/m²     Vitals and nursing note reviewed.   Constitutional:       Appearance: Healthy appearance. Not in distress.   Eyes:      Conjunctiva/sclera: Conjunctivae normal.      Pupils: Pupils are equal, round, and reactive to light.   HENT:      Head: Normocephalic.   Pulmonary:      Effort: Pulmonary effort is normal.      Breath sounds: Normal breath sounds.   Cardiovascular:      PMI at left midclavicular line. Normal rate. Regular rhythm.   Abdominal:      General: Bowel sounds are normal.      Palpations: Abdomen is soft.   Musculoskeletal: Normal range of motion.      Cervical back: Normal range of motion and neck supple. Skin:     General: Skin is warm and dry.   Neurological:      Mental Status: Alert, oriented to person, place, and time and oriented to person, place and time.   Procedures            @ASSESSMENT/PLAN@        Diagnoses and all orders for this visit:    1. " Coronary artery calcification seen on CAT scan (Primary)  -     empagliflozin (Jardiance) 25 MG tablet tablet; Take 1 tablet by mouth Daily.  Dispense: 90 tablet; Refill: 3  -     clopidogrel (PLAVIX) 75 MG tablet; Take 1 tablet by mouth Daily.  Dispense: 90 tablet; Refill: 3    2. Essential hypertension  -     Comprehensive Metabolic Panel; Future    3. Hypercholesterolemia  -     Lipid Panel; Future    4. Type 2 diabetes mellitus without complication, without long-term current use of insulin  -     empagliflozin (Jardiance) 25 MG tablet tablet; Take 1 tablet by mouth Daily.  Dispense: 90 tablet; Refill: 3  -     Hemoglobin A1c; Future    5. Metabolic syndrome  -     CBC & Differential; Future    6. Sleep apnea in adult       At baseline his heart rate is stable.  His blood pressure is slightly elevated.  He also is little nervous today.  His BMI did come down to 33.  His cardiovascular examination is otherwise unremarkable    Regarding the coronary calcification, I had a long talk with him about the calcification whether it is more in the endothelium or in the epithelium.  As long he is not asymptomatic and the stress test was negative I think we can manage him medically.  I did explain to him the only way to know about the CAD is to do a cardiac catheterization.  At this time he like to wait regarding that.  Meanwhile I advised him to change metformin to Jardiance because of the cardiac problem and I also added Plavix 75 mg once a day    Regarding his blood pressure which is still elevated, he is on Procardia XL, Bystolic, Microzide and hydrochlorothiazide.  He is advised to check his blood pressure regularly at home and if it is still elevated then we will add Aldactone or increase Procardia to 90 mg    Regarding his hypercholesterolemia, he is on moderate dose of Crestor.  I did explain to him that his LDL is well-controlled reducing it more will not be helpful.  Will check his lipid profile again    Regarding  diabetes, will check his A1c level after changing metformin to Jardiance    Regarding metabolic syndrome, he has lost some weight.  Encouraged him to continue to do so    Regarding sleep apnea, he does use CPAP regularly    I will see him back in 3 months and if there is any change in his symptoms and he will undergo elective cardiac catheterization.                  Electronically signed by Elvis Oseguera MD December 17, 2024 14:50 EST

## 2025-02-05 ENCOUNTER — TELEPHONE (OUTPATIENT)
Dept: CARDIOLOGY | Facility: CLINIC | Age: 65
End: 2025-02-05
Payer: COMMERCIAL

## 2025-02-05 NOTE — TELEPHONE ENCOUNTER
Patient aware of recommendations to Stop Plavix  He may need to see his dentist to make sure his gums are okay.  Patient verbalized understanding.

## 2025-02-05 NOTE — TELEPHONE ENCOUNTER
Patient left VM, he is having more bruising and did notice last night when flossing he had much more bleeding from gums.  Reported his gums normally do not bleed.  He is taking his ASA 81 mg daily and Plavix 75 mg daily.  Plavix was added at 12/17/2024 office visit.

## (undated) DEVICE — ST PIN FIX TEMP UNIVERSREVERS W/BRKAWAY/GUIDE/PIN 2.4MM STRL

## (undated) DEVICE — SYR SLP TP 10ML DISP

## (undated) DEVICE — SUT VIC 2/0 CT2 27IN J269H

## (undated) DEVICE — PENCL SMOKE/EVAC MEGADYNE TELESCP 10FT

## (undated) DEVICE — UNDERGLV SURG BIOGEL INDICAT PI SZ8 BLU

## (undated) DEVICE — 3M™ IOBAN™ 2 ANTIMICROBIAL INCISE DRAPE 6651EZ: Brand: IOBAN™ 2

## (undated) DEVICE — SPNG GZ WOVN 4X4IN 12PLY 10/BX STRL

## (undated) DEVICE — INTENDED TO SUPPORT AND MAINTAIN THE POSITION OF AN ANESTHETIZED PATIENT DURING SURGERY: Brand: ERIN BEACH CHAIR FACE MASK

## (undated) DEVICE — INTENDED FOR TISSUE SEPARATION, AND OTHER PROCEDURES THAT REQUIRE A SHARP SURGICAL BLADE TO PUNCTURE OR CUT.: Brand: BARD-PARKER ® CARBON RIB-BACK BLADES

## (undated) DEVICE — PK MAJ SHLDR SPLT 10

## (undated) DEVICE — PATIENT RETURN ELECTRODE, SINGLE-USE, CONTACT QUALITY MONITORING, ADULT, WITH 9FT CORD, FOR PATIENTS WEIGING OVER 33LBS. (15KG): Brand: MEGADYNE

## (undated) DEVICE — GLV SURG SENSICARE PI MIC PF SZ7.5 LF STRL

## (undated) DEVICE — OSCILLATING TIP SAW CARTRIDGE: Brand: PRECISION FALCON

## (undated) DEVICE — DRAPE,REIN 53X77,STERILE: Brand: MEDLINE

## (undated) DEVICE — DRP SURG U/DRP INVISISHIELD PA 48X52IN

## (undated) DEVICE — UNDERGLV SURG BIOGEL INDICAT PF 61/2 GRN

## (undated) DEVICE — GLV SURG SENSICARE PI ORTHO SZ7.5 LF STRL

## (undated) DEVICE — BIT DRL UNIVERS REVERS MODULAR/GLEN 3MM STRL

## (undated) DEVICE — REAMR ANG UNIVERSREVERS GLEN MD DISP

## (undated) DEVICE — SKN PREP SPNG STKS PVP PNT STR: Brand: MEDLINE INDUSTRIES, INC.

## (undated) DEVICE — DRAPE,TOWEL,LARGE,INVISISHIELD: Brand: MEDLINE

## (undated) DEVICE — ANTIBACTERIAL UNDYED BRAIDED (POLYGLACTIN 910), SYNTHETIC ABSORBABLE SUTURE: Brand: COATED VICRYL

## (undated) DEVICE — KT PUMP INFUBLOCK MDL 2100 PMKITSOLIS

## (undated) DEVICE — PULLOVER TOGA, 2X LARGE: Brand: FLYTE, SURGICOOL

## (undated) DEVICE — BLANKT WARM LOWR/BDY 100X120CM

## (undated) DEVICE — PROXIMATE RH ROTATING HEAD SKIN STAPLERS (35 WIDE) CONTAINS 35 STAINLESS STEEL STAPLES: Brand: PROXIMATE

## (undated) DEVICE — SLNG ULTRSLING3 13TO15IN LG

## (undated) DEVICE — 450 ML BOTTLE OF 0.05% CHLORHEXIDINE GLUCONATE IN 99.95% STERILE WATER FOR IRRIGATION, USP AND APPLICATOR.: Brand: IRRISEPT ANTIMICROBIAL WOUND LAVAGE

## (undated) DEVICE — 3 BONE CEMENT MIXER: Brand: MIXEVAC

## (undated) DEVICE — GLV SURG PREMIERPRO MIC LTX PF SZ6.5 BRN